# Patient Record
Sex: FEMALE | Race: WHITE | NOT HISPANIC OR LATINO | Employment: UNEMPLOYED | URBAN - METROPOLITAN AREA
[De-identification: names, ages, dates, MRNs, and addresses within clinical notes are randomized per-mention and may not be internally consistent; named-entity substitution may affect disease eponyms.]

---

## 2018-09-28 ENCOUNTER — OFFICE VISIT (OUTPATIENT)
Dept: URGENT CARE | Facility: CLINIC | Age: 33
End: 2018-09-28
Payer: MEDICAID

## 2018-09-28 VITALS
TEMPERATURE: 99 F | DIASTOLIC BLOOD PRESSURE: 67 MMHG | HEART RATE: 77 BPM | SYSTOLIC BLOOD PRESSURE: 109 MMHG | WEIGHT: 138 LBS | HEIGHT: 64 IN | OXYGEN SATURATION: 98 % | RESPIRATION RATE: 18 BRPM | BODY MASS INDEX: 23.56 KG/M2

## 2018-09-28 DIAGNOSIS — L73.9 FOLLICULITIS: Primary | ICD-10-CM

## 2018-09-28 PROCEDURE — 99214 OFFICE O/P EST MOD 30 MIN: CPT | Mod: S$GLB,,, | Performed by: FAMILY MEDICINE

## 2018-09-28 RX ORDER — CLONAZEPAM 0.5 MG/1
TABLET ORAL
Refills: 0 | COMMUNITY
Start: 2018-07-11 | End: 2018-11-04

## 2018-09-28 RX ORDER — CLONAZEPAM 1 MG/1
1 TABLET ORAL DAILY
Refills: 0 | COMMUNITY
Start: 2018-08-20 | End: 2019-08-07

## 2018-09-28 RX ORDER — LORATADINE 10 MG/1
10 TABLET ORAL DAILY
Qty: 30 TABLET | Refills: 2 | Status: SHIPPED | OUTPATIENT
Start: 2018-09-28 | End: 2019-05-30

## 2018-09-28 RX ORDER — DESVENLAFAXINE SUCCINATE 50 MG/1
50 TABLET, EXTENDED RELEASE ORAL DAILY
Refills: 1 | COMMUNITY
Start: 2018-08-21

## 2018-09-28 RX ORDER — DOXYCYCLINE 100 MG/1
100 CAPSULE ORAL 2 TIMES DAILY
Qty: 14 CAPSULE | Refills: 0 | Status: SHIPPED | OUTPATIENT
Start: 2018-09-28 | End: 2018-10-05

## 2018-09-28 NOTE — PROGRESS NOTES
"Subjective:       Patient ID: Jocelynn Fowler is a 33 y.o. female.    Vitals:  height is 5' 4" (1.626 m) and weight is 62.6 kg (138 lb). Her temperature is 98.5 °F (36.9 °C). Her blood pressure is 109/67 and her pulse is 77. Her respiration is 18 and oxygen saturation is 98%.     Chief Complaint: Rash        Rash   This is a new problem. The current episode started yesterday. The problem is unchanged. The affected locations include the face. The rash is characterized by blistering, itchiness and redness. She was exposed to a new animal. Pertinent negatives include no fever, joint pain, shortness of breath or sore throat. Past treatments include nothing. The treatment provided no relief.     Review of Systems   Constitution: Negative for chills and fever.   HENT: Negative for sore throat.    Respiratory: Negative for shortness of breath.    Skin: Positive for itching and rash.   Musculoskeletal: Negative for joint pain.   All other systems reviewed and are negative.      Objective:      Physical Exam   Constitutional: She is oriented to person, place, and time. She appears well-developed and well-nourished. No distress.   HENT:   Head: Normocephalic and atraumatic.   Right Ear: External ear normal.   Left Ear: External ear normal.   Nose: Nose normal.   Eyes: Conjunctivae and EOM are normal.   Neck: Normal range of motion.   Pulmonary/Chest: Effort normal. No respiratory distress.   Musculoskeletal: Normal range of motion.   Neurological: She is alert and oriented to person, place, and time.   Skin: Skin is warm and dry. Lesion and rash noted. Rash is macular, papular and pustular. She is not diaphoretic.   Psychiatric: She has a normal mood and affect. Her behavior is normal. Judgment and thought content normal.       Assessment:       1. Folliculitis        Plan:         Folliculitis  -     doxycycline (VIBRAMYCIN) 100 MG Cap; Take 1 capsule (100 mg total) by mouth 2 (two) times daily. for 7 days  Dispense: 14 capsule; " Refill: 0  -     loratadine (CLARITIN) 10 mg tablet; Take 1 tablet (10 mg total) by mouth once daily.  Dispense: 30 tablet; Refill: 2      Patient Instructions   If you were prescribed a narcotic or controlled medication, do not drive or operate heavy equipment or machinery while taking these medications.  You must understand that you've received an Urgent Care treatment only and that you may be released before all your medical problems are known or treated. You, the patient, will arrange for follow up care as instructed.  Follow up with your PCP or specialty clinic as directed in the next 1-2 weeks if not improved or as needed.  You can call [phone] to schedule an appointment with the appropriate provider.  If your condition worsens we recommend that you receive another evaluation at the emergency room immediately or contact your primary medical clinics after hours call service to discuss your concerns.  Please return here or go to the Emergency Department for any concerns or worsening of condition.  Wash wound with soap and water. If you notice redness, swelling or pain, start soaking in warm water with epsom salts, and start antibiotic and make appointment to be seen by your doctor.     Folliculitis  Folliculitis is an inflammation of a hair follicle. A hair follicle is the little pocket where a hair grows out of the skin. Bacteria normally live on the skin. But sometimes bacteria can get trapped in a follicle and cause infection. This causes a bumpy rash. The area over the follicles is red and raised. It may itch or be painful. The bumps may have fluid (pus) inside. The pus may leak and then form crusts. Sores can spread to other areas of the body. Once it goes away, folliculitis can come back at any time. Severe cases may cause permanent hair loss and scarring.  Folliculitis can happen anywhere on the body where hair grows. It can be caused by rubbing from tight clothing. Ingrown hairs can cause it.  Soaking in a hot tub or swimming pool that has bacteria in the water can cause it. It may also occur if a hair follicle is blocked by a bandage.  Sores often go away in a few days with no treatment. In some cases, medicine may be given. A small piece of skin or pus may be taken to find the type of bacteria causing the infection.  Home care  The healthcare provider may prescribe an antibiotic cream or ointment.  Oral antibiotics may also be prescribed. Or you may be told to use an over-the-counter antibiotic cream. Follow all instructions when using any of these medicines.  General care:  · Apply warm, moist compresses to the sores for 20 minutes up to 3 times a day. You can make a compress by soaking a cloth in warm water. Squeeze out excess water.  · Dont cut, poke, or squeeze the sores. This can be painful and spread infection.  · Dont scratch the affected area. Scratching can delay healing.  · Dont shave the areas affected by folliculitis.  · If the sores leak fluid, cover the area with a nonstick gauze bandage. Use as little tape as possible. Carefully discard all soiled bandages.  · Dress in loose cotton clothing.  · Change sheets and blankets if they are soiled by pus. Wash all clothes, towels, sheets, and cloth diapers in soap and hot water. Do not share clothes, towels, or sheets with other family members.  · Do not soak the sores in bath water. This can spread infection. Instead, keep the area clean by gently washing sores with soap and warm water.  · Wash your hands or use antibacterial gels often to prevent spreading the bacteria.  Follow-up care  Follow up with your healthcare provider, or as advised.  When to seek medical advice  Call your healthcare provider right away if any of these occur:  · Fever of 100.4°F (38°C) or higher  · Spreading of the rash  · Rash does not get better with treatment  · Redness or swelling that gets worse  · Rash becomes more painful  · Foul-smelling fluid leaking from  the skin  · Rash improves, but then comes back   Date Last Reviewed: 11/1/2016  © 4719-2077 [employer]. [address] [place] [address], [place] [address]. All rights reserved. This information is not intended as a substitute for professional medical care. Always follow your healthcare professional's instructions.

## 2018-09-28 NOTE — PATIENT INSTRUCTIONS
If you were prescribed a narcotic or controlled medication, do not drive or operate heavy equipment or machinery while taking these medications.  You must understand that you've received an Urgent Care treatment only and that you may be released before all your medical problems are known or treated. You, the patient, will arrange for follow up care as instructed.  Follow up with your PCP or specialty clinic as directed in the next 1-2 weeks if not improved or as needed.  You can call [phone] to schedule an appointment with the appropriate provider.  If your condition worsens we recommend that you receive another evaluation at the emergency room immediately or contact your primary medical clinics after hours call service to discuss your concerns.  Please return here or go to the Emergency Department for any concerns or worsening of condition.  Wash wound with soap and water. If you notice redness, swelling or pain, start soaking in warm water with epsom salts, and start antibiotic and make appointment to be seen by your doctor.     Folliculitis  Folliculitis is an inflammation of a hair follicle. A hair follicle is the little pocket where a hair grows out of the skin. Bacteria normally live on the skin. But sometimes bacteria can get trapped in a follicle and cause infection. This causes a bumpy rash. The area over the follicles is red and raised. It may itch or be painful. The bumps may have fluid (pus) inside. The pus may leak and then form crusts. Sores can spread to other areas of the body. Once it goes away, folliculitis can come back at any time. Severe cases may cause permanent hair loss and scarring.  Folliculitis can happen anywhere on the body where hair grows. It can be caused by rubbing from tight clothing. Ingrown hairs can cause it. Soaking in a hot tub or swimming pool that has bacteria in the water can cause it. It may also occur if a hair follicle is blocked by a bandage.  Sores often go away  in a few days with no treatment. In some cases, medicine may be given. A small piece of skin or pus may be taken to find the type of bacteria causing the infection.  Home care  The healthcare provider may prescribe an antibiotic cream or ointment.  Oral antibiotics may also be prescribed. Or you may be told to use an over-the-counter antibiotic cream. Follow all instructions when using any of these medicines.  General care:  · Apply warm, moist compresses to the sores for 20 minutes up to 3 times a day. You can make a compress by soaking a cloth in warm water. Squeeze out excess water.  · Dont cut, poke, or squeeze the sores. This can be painful and spread infection.  · Dont scratch the affected area. Scratching can delay healing.  · Dont shave the areas affected by folliculitis.  · If the sores leak fluid, cover the area with a nonstick gauze bandage. Use as little tape as possible. Carefully discard all soiled bandages.  · Dress in loose cotton clothing.  · Change sheets and blankets if they are soiled by pus. Wash all clothes, towels, sheets, and cloth diapers in soap and hot water. Do not share clothes, towels, or sheets with other family members.  · Do not soak the sores in bath water. This can spread infection. Instead, keep the area clean by gently washing sores with soap and warm water.  · Wash your hands or use antibacterial gels often to prevent spreading the bacteria.  Follow-up care  Follow up with your healthcare provider, or as advised.  When to seek medical advice  Call your healthcare provider right away if any of these occur:  · Fever of 100.4°F (38°C) or higher  · Spreading of the rash  · Rash does not get better with treatment  · Redness or swelling that gets worse  · Rash becomes more painful  · Foul-smelling fluid leaking from the skin  · Rash improves, but then comes back   Date Last Reviewed: 11/1/2016  © 9629-4881 [employer]. [address] [place] [address], [place] [address]. All  rights reserved. This information is not intended as a substitute for professional medical care. Always follow your healthcare professional's instructions.

## 2018-11-04 ENCOUNTER — HOSPITAL ENCOUNTER (EMERGENCY)
Facility: HOSPITAL | Age: 33
Discharge: HOME | End: 2018-11-05
Attending: EMERGENCY MEDICINE
Payer: MEDICAID

## 2018-11-04 DIAGNOSIS — J02.0 STREP PHARYNGITIS: Primary | ICD-10-CM

## 2018-11-04 LAB
B-HCG UR QL: NEGATIVE
BACTERIA #/AREA URNS HPF: NORMAL /HPF
BILIRUB UR QL STRIP: NEGATIVE
CLARITY UR: CLEAR
COLOR UR: YELLOW
CTP QC/QA: YES
DEPRECATED S PYO AG THROAT QL EIA: POSITIVE
FLUAV AG SPEC QL IA: NEGATIVE
FLUBV AG SPEC QL IA: NEGATIVE
GLUCOSE UR QL STRIP: NEGATIVE
HGB UR QL STRIP: NEGATIVE
KETONES UR QL STRIP: NEGATIVE
LEUKOCYTE ESTERASE UR QL STRIP: ABNORMAL
MICROSCOPIC COMMENT: NORMAL
NITRITE UR QL STRIP: NEGATIVE
PH UR STRIP: 8 [PH] (ref 5–8)
PROT UR QL STRIP: NEGATIVE
RBC #/AREA URNS HPF: 2 /HPF (ref 0–4)
SP GR UR STRIP: 1.02 (ref 1–1.03)
SPECIMEN SOURCE: NORMAL
SQUAMOUS #/AREA URNS HPF: 7 /HPF
URN SPEC COLLECT METH UR: ABNORMAL
UROBILINOGEN UR STRIP-ACNC: ABNORMAL EU/DL
WBC #/AREA URNS HPF: 3 /HPF (ref 0–5)

## 2018-11-04 PROCEDURE — 81000 URINALYSIS NONAUTO W/SCOPE: CPT

## 2018-11-04 PROCEDURE — 96372 THER/PROPH/DIAG INJ SC/IM: CPT

## 2018-11-04 PROCEDURE — 87400 INFLUENZA A/B EACH AG IA: CPT

## 2018-11-04 PROCEDURE — 96374 THER/PROPH/DIAG INJ IV PUSH: CPT

## 2018-11-04 PROCEDURE — 96361 HYDRATE IV INFUSION ADD-ON: CPT

## 2018-11-04 PROCEDURE — 25000003 PHARM REV CODE 250: Performed by: NURSE PRACTITIONER

## 2018-11-04 PROCEDURE — 99284 EMERGENCY DEPT VISIT MOD MDM: CPT | Mod: 25

## 2018-11-04 PROCEDURE — 81025 URINE PREGNANCY TEST: CPT | Performed by: NURSE PRACTITIONER

## 2018-11-04 PROCEDURE — 87880 STREP A ASSAY W/OPTIC: CPT

## 2018-11-04 PROCEDURE — 63600175 PHARM REV CODE 636 W HCPCS: Performed by: NURSE PRACTITIONER

## 2018-11-04 RX ORDER — FLUOXETINE HYDROCHLORIDE 20 MG/1
20 CAPSULE ORAL DAILY
COMMUNITY

## 2018-11-04 RX ORDER — IBUPROFEN 600 MG/1
600 TABLET ORAL EVERY 6 HOURS PRN
Qty: 20 TABLET | Refills: 0 | Status: SHIPPED | OUTPATIENT
Start: 2018-11-04 | End: 2018-11-04 | Stop reason: SDUPTHER

## 2018-11-04 RX ORDER — IBUPROFEN 600 MG/1
600 TABLET ORAL EVERY 6 HOURS PRN
Qty: 20 TABLET | Refills: 0 | Status: SHIPPED | OUTPATIENT
Start: 2018-11-04 | End: 2019-05-30

## 2018-11-04 RX ORDER — DEXAMETHASONE SODIUM PHOSPHATE 4 MG/ML
8 INJECTION, SOLUTION INTRA-ARTICULAR; INTRALESIONAL; INTRAMUSCULAR; INTRAVENOUS; SOFT TISSUE
Status: COMPLETED | OUTPATIENT
Start: 2018-11-04 | End: 2018-11-04

## 2018-11-04 RX ORDER — ACETAMINOPHEN 500 MG
1000 TABLET ORAL
Status: COMPLETED | OUTPATIENT
Start: 2018-11-04 | End: 2018-11-04

## 2018-11-04 RX ORDER — IBUPROFEN 600 MG/1
600 TABLET ORAL
Status: COMPLETED | OUTPATIENT
Start: 2018-11-04 | End: 2018-11-04

## 2018-11-04 RX ADMIN — PENICILLIN G BENZATHINE 1.2 MILLION UNITS: 1200000 INJECTION, SUSPENSION INTRAMUSCULAR at 11:11

## 2018-11-04 RX ADMIN — ACETAMINOPHEN 1000 MG: 500 TABLET, FILM COATED ORAL at 11:11

## 2018-11-04 RX ADMIN — SODIUM CHLORIDE 1000 ML: 0.9 INJECTION, SOLUTION INTRAVENOUS at 11:11

## 2018-11-04 RX ADMIN — IBUPROFEN 600 MG: 600 TABLET, FILM COATED ORAL at 11:11

## 2018-11-04 RX ADMIN — DEXAMETHASONE SODIUM PHOSPHATE 8 MG: 4 INJECTION, SOLUTION INTRAMUSCULAR; INTRAVENOUS at 11:11

## 2018-11-05 VITALS
TEMPERATURE: 99 F | BODY MASS INDEX: 23.56 KG/M2 | DIASTOLIC BLOOD PRESSURE: 71 MMHG | HEIGHT: 64 IN | WEIGHT: 138 LBS | SYSTOLIC BLOOD PRESSURE: 119 MMHG | OXYGEN SATURATION: 98 % | RESPIRATION RATE: 18 BRPM | HEART RATE: 88 BPM

## 2018-11-05 NOTE — ED PROVIDER NOTES
"Encounter Date: 11/4/2018    SCRIBE #1 NOTE: ISon, am scribing for, and in the presence of,  Winnie Cristina NP . I have scribed the following portions of the note - Other sections scribed: HPI, ROS .       History     Chief Complaint   Patient presents with    Generalized Body Aches     since yesterday; reports n/v, one episode v; reports "hurting all over," fevers, chills, overheating, and sore throat; reports taking ibuprofen this morning for fever control     CC: Generalized Body Aches    33 y.o. Female with a past medical history of Anemia, Anxiety, Hep C w/o coma, chronic, Hypothyroid, IV drug user, Ovarian cyst, and Renal disorder presents to ED c/o generalized body aches for the past 2x days. She also reports subjective fever, chills, sore throat, and 3x episodes of emesis yesterday. She notes attempted tx with Ibuprofen this morning with no relief. Patient notes smoking half a pack of cigarettes a day, last using heroin 1x month ago, and now using Methadone. She denies neck pain/stiffness, back pain, abdominal pain, diarrhea, dysuria, congestion, rhinorrhea, cough, and sick contacts. No other associated symptoms.             The history is provided by the patient. No  was used.     Review of patient's allergies indicates:   Allergen Reactions    Toradol [ketorolac] Hives     Past Medical History:   Diagnosis Date    Anemia     Anxiety     Hep C w/o coma, chronic     Hypothyroid     IV drug user     Ovarian cyst     Renal disorder      History reviewed. No pertinent surgical history.  Family History   Problem Relation Age of Onset    Heart disease Maternal Grandmother     Heart disease Maternal Grandfather     Heart disease Paternal Grandmother     Heart disease Paternal Grandfather      Social History     Tobacco Use    Smoking status: Current Every Day Smoker     Packs/day: 0.50     Types: Cigarettes    Smokeless tobacco: Never Used   Substance Use Topics "    Alcohol use: No    Drug use: No     Comment: heroin past     Review of Systems   Constitutional: Positive for chills and fever. Negative for appetite change.   HENT: Positive for sore throat. Negative for congestion and rhinorrhea.    Eyes: Negative for visual disturbance.   Respiratory: Negative for cough and shortness of breath.    Cardiovascular: Negative for chest pain.   Gastrointestinal: Positive for vomiting (3x). Negative for abdominal pain and diarrhea.   Genitourinary: Negative for dysuria, frequency and urgency.   Musculoskeletal: Negative for gait problem.   Skin: Negative for rash.   Neurological: Negative for syncope.       Physical Exam     Initial Vitals [11/04/18 2210]   BP Pulse Resp Temp SpO2   111/66 (!) 116 18 (!) 100.5 °F (38.1 °C) 97 %      MAP       --         Physical Exam    Constitutional: She appears well-developed and well-nourished. She is not diaphoretic. She appears ill. No distress.   HENT:   Head: Normocephalic and atraumatic.   Right Ear: Hearing, tympanic membrane, external ear and ear canal normal.   Left Ear: Hearing, tympanic membrane, external ear and ear canal normal.   Nose: Nose normal.   Mouth/Throat: Uvula is midline and mucous membranes are normal. No uvula swelling. Oropharyngeal exudate, posterior oropharyngeal edema and posterior oropharyngeal erythema present. No tonsillar abscesses.   Eyes: Conjunctivae and EOM are normal. Pupils are equal, round, and reactive to light.   Neck: Trachea normal, normal range of motion, full passive range of motion without pain and phonation normal. Neck supple. No stridor present. No spinous process tenderness and no muscular tenderness present. Normal range of motion present. No neck rigidity. No Brudzinski's sign and no Kernig's sign noted.   Cardiovascular: Normal rate, regular rhythm and normal heart sounds.   Pulmonary/Chest: Breath sounds normal. No respiratory distress.   Abdominal: Soft. There is no tenderness.    Musculoskeletal: Normal range of motion.   Lymphadenopathy:     She has cervical adenopathy.   Neurological: She is alert and oriented to person, place, and time.   Skin: Skin is warm and dry.   Psychiatric: She has a normal mood and affect. Her behavior is normal.         ED Course   Procedures  Labs Reviewed   THROAT SCREEN, RAPID - Abnormal; Notable for the following components:       Result Value    Rapid Strep A Screen Positive (*)     All other components within normal limits   URINALYSIS, REFLEX TO URINE CULTURE - Abnormal; Notable for the following components:    Urobilinogen, UA 4.0-6.0 (*)     Leukocytes, UA Trace (*)     All other components within normal limits    Narrative:     Preferred Collection Type->Urine, Clean Catch   INFLUENZA A AND B ANTIGEN   URINALYSIS MICROSCOPIC    Narrative:     Preferred Collection Type->Urine, Clean Catch   POCT URINE PREGNANCY          Imaging Results    None          Medical Decision Making:   ED Management:  This is an evaluation of a 33 y.o. female that presents to the Emergency Department for sore throat, body aches, subjective fever and chills.  The patient is a non-toxic, afebrile, and well appearing female. On physical exam, there is tonsillar exudates, erythema; she has no trismus, uvula deviation, drooling, stridor, or respiratory distress. No sign of PTA. There is no cervical lymphadenopathy. Neck is soft and supple with no meningeal signs. Breath sounds clear and equal bilaterally. Abdomen soft and nontender. No skin lesions or rashes. No murmur.  Heart with regular rate and rhythm. Doubt endocarditis.    Vital Signs Are Reassuring.   Rapid Strep Test: Positive  Urine without infection.    She was given IV fluids, Bicillin, Decadron, ibuprofen, and Tylenol with good relief in symptoms.  Upon reassessment, she reports feeling improved.  Heart rate has improved still with low-grade temp.    Given the above findings, my overall impression is strep pharyngitis.  Given the above findings, I do not think the patient has OM, OE, peritonsillar abscess, retropharyngeal abscess, epiglotitis, meningitis, endocarditis, or airway compromise.    The patient will be discharged home. Home care: OTC medications for symptomatic relief, sore throat self care DC instructions. The diagnosis, treatment plan, instructions for follow-up and reevaluation with Primary Care as well as ED return precautions have been discussed with the patient and understanding of the information was verbalized. All questions or concerns from the patient have been addressed.     This case was discussed with and the patient has been examined by Dr. Velazquez who is in agreement with my assessment and plan.            Scribe Attestation:   Scribe #1: I performed the above scribed service and the documentation accurately describes the services I performed. I attest to the accuracy of the note.    Attending Attestation:           Physician Attestation for Scribe:  Physician Attestation Statement for Scribe #1: I,  Winnie Cristina NP, reviewed documentation, as scribed by Son Dan in my presence, and it is both accurate and complete.                    Clinical Impression:   The encounter diagnosis was Strep pharyngitis.      Disposition:   Disposition: Discharged  Condition: Stable                        Winnie Cristina NP  11/05/18 0007

## 2018-11-05 NOTE — ED TRIAGE NOTES
Pt c/o fever, generalized body aches, sore throat, nausea since this AM. Also reports vomiting yesterday but none today. Denies cough, congestion, dysuria, diarrhea. Denies flu shot. Pain is 10/10. Pt took ibuprofen at 1030 this AM

## 2018-11-05 NOTE — DISCHARGE INSTRUCTIONS
Alternate ibuprofen and Tylenol to help decrease fever and pain. Drink plenty of fluids to stay hydrated.    Please return to the Emergency Department for any new or worsening symptoms including:  Worsening pain, difficulty swallowing, fever, chest pain, shortness of breath, loss of consciousness, dizziness, weakness, or any other concerns.     Please follow up with your Primary Care Provider within in the week. If you do not have one, you may contact the one listed on your discharge paperwork or you may also call the [facility] Appointment Desk at [phone] to schedule an appointment with one.     Please take all medication as prescribed.

## 2019-02-12 ENCOUNTER — LAB VISIT (OUTPATIENT)
Dept: LAB | Facility: HOSPITAL | Age: 34
End: 2019-02-12
Attending: INTERNAL MEDICINE
Payer: MEDICAID

## 2019-02-12 DIAGNOSIS — E03.8 OTHER SPECIFIED HYPOTHYROIDISM: ICD-10-CM

## 2019-02-12 LAB
T3 SERPL-MCNC: 112 NG/DL
T4 FREE SERPL-MCNC: 0.99 NG/DL
THYROPEROXIDASE IGG SERPL-ACNC: 14.7 IU/ML
TSH SERPL DL<=0.005 MIU/L-ACNC: 1.37 UIU/ML

## 2019-02-12 PROCEDURE — 84439 ASSAY OF FREE THYROXINE: CPT

## 2019-02-12 PROCEDURE — 84443 ASSAY THYROID STIM HORMONE: CPT

## 2019-02-12 PROCEDURE — 84480 ASSAY TRIIODOTHYRONINE (T3): CPT

## 2019-02-12 PROCEDURE — 36415 COLL VENOUS BLD VENIPUNCTURE: CPT

## 2019-02-12 PROCEDURE — 86376 MICROSOMAL ANTIBODY EACH: CPT

## 2019-05-29 ENCOUNTER — TELEPHONE (OUTPATIENT)
Dept: OBSTETRICS AND GYNECOLOGY | Facility: CLINIC | Age: 34
End: 2019-05-29

## 2019-05-29 NOTE — TELEPHONE ENCOUNTER
----- Message from Huma Conde sent at 5/29/2019 12:56 PM CDT -----  Contact: Self      Can the clinic reply in [facility]: N    Who Called: NIR NORMAN [[id number]]    Date of Positive Preg Test: 04/29/2019    1st day of Last Menstrual Cycle: 01/03 OR 01/4/2019    List Any Difficulties: Spotting early on and was dehydrated. Pt is currently at a Methadone clinic. 90MGs a day.     What Number to Call Back: [phone]

## 2019-05-30 ENCOUNTER — HOSPITAL ENCOUNTER (EMERGENCY)
Facility: HOSPITAL | Age: 34
Discharge: HOME | End: 2019-05-30
Attending: EMERGENCY MEDICINE
Payer: MEDICAID

## 2019-05-30 VITALS
DIASTOLIC BLOOD PRESSURE: 55 MMHG | SYSTOLIC BLOOD PRESSURE: 98 MMHG | BODY MASS INDEX: 29.01 KG/M2 | HEART RATE: 74 BPM | OXYGEN SATURATION: 97 % | TEMPERATURE: 98 F | RESPIRATION RATE: 18 BRPM | WEIGHT: 169 LBS

## 2019-05-30 DIAGNOSIS — L29.9 ITCHING: Primary | ICD-10-CM

## 2019-05-30 DIAGNOSIS — Z34.90 PREGNANCY, UNSPECIFIED GESTATIONAL AGE: ICD-10-CM

## 2019-05-30 LAB
B-HCG UR QL: POSITIVE
BILIRUBIN, POC UA: ABNORMAL
BLOOD, POC UA: NEGATIVE
CLARITY, POC UA: CLEAR
COLOR, POC UA: ABNORMAL
CTP QC/QA: YES
GLUCOSE, POC UA: ABNORMAL
KETONES, POC UA: ABNORMAL
LEUKOCYTE EST, POC UA: NEGATIVE
NITRITE, POC UA: NEGATIVE
PH UR STRIP: 6 [PH]
PROTEIN, POC UA: ABNORMAL
SPECIFIC GRAVITY, POC UA: >=1.03
UROBILINOGEN, POC UA: >=8 E.U./DL

## 2019-05-30 PROCEDURE — 81025 URINE PREGNANCY TEST: CPT | Mod: ER | Performed by: EMERGENCY MEDICINE

## 2019-05-30 PROCEDURE — 99283 EMERGENCY DEPT VISIT LOW MDM: CPT | Mod: 25,ER

## 2019-05-30 PROCEDURE — 81003 URINALYSIS AUTO W/O SCOPE: CPT | Mod: ER

## 2019-05-30 RX ORDER — DIPHENHYDRAMINE HCL 25 MG
25 CAPSULE ORAL EVERY 6 HOURS PRN
Qty: 20 CAPSULE | Refills: 0 | Status: SHIPPED | OUTPATIENT
Start: 2019-05-30 | End: 2019-08-21

## 2019-05-30 RX ORDER — LORATADINE 10 MG/1
10 TABLET ORAL DAILY
Qty: 60 TABLET | Refills: 0 | Status: SHIPPED | OUTPATIENT
Start: 2019-05-30 | End: 2020-05-29

## 2019-05-30 NOTE — ED PROVIDER NOTES
"Encounter Date: 5/30/2019    SCRIBE #1 NOTE: I, Damion Smalls, am scribing for, and in the presence of,  Dr. Lai. I have scribed the following portions of the note - Other sections scribed: HPI, ROS, PE.       History     Chief Complaint   Patient presents with    Itching     Pt states," My  cleans houses out and has been itching for several days. I have been itching for last two days and I am pregnant."     Jocelynn Fowler is a 33 y.o. female who presents to the ED complaining of itching all over her body for 2 days.  Pt states that her  had scabies recently.  Pt received an US and was 14 weeks and 3 days pregnant on 5/1/19.  Pt has not yet seen a OB/GYN and G7:P5:A1.  Pt drove herself to the ED today.  Pt has a history of Hep C and hyperthyroidism and no mediation was prescribed for unknown reason.  Pt smokes and is currently on methadone.    The history is provided by the patient. No  was used.     Review of patient's allergies indicates:   Allergen Reactions    Toradol [ketorolac] Hives     Past Medical History:   Diagnosis Date    Anemia     Anxiety     Depression     Encounter for blood transfusion     Hep C w/o coma, chronic     Hypothyroid     IV drug user     Ovarian cyst     Renal disorder     Substance abuse      History reviewed. No pertinent surgical history.  Family History   Problem Relation Age of Onset    Heart disease Maternal Grandmother     Heart disease Maternal Grandfather     Heart disease Paternal Grandmother     Heart disease Paternal Grandfather      Social History     Tobacco Use    Smoking status: Current Every Day Smoker     Packs/day: 0.50     Types: Cigarettes    Smokeless tobacco: Never Used   Substance Use Topics    Alcohol use: No    Drug use: No     Comment: heroin past     Review of Systems   Constitutional: Negative for fever.   HENT: Negative for sore throat.    Eyes: Negative for redness.   Respiratory: Negative for cough.  "   Gastrointestinal: Negative for diarrhea, nausea and vomiting.   Skin: Negative for color change and rash.        Positive generalized itching.   Neurological: Negative for dizziness and weakness.   All other systems reviewed and are negative.      Physical Exam     Initial Vitals [05/30/19 1631]   BP Pulse Resp Temp SpO2   100/62 90 16 98 °F (36.7 °C) 98 %      MAP       --         Patient gave consent to have physical exam performed.    Physical Exam    Nursing note and vitals reviewed.  Constitutional: She appears well-developed and well-nourished.   HENT:   Head: Normocephalic and atraumatic.   Right Ear: External ear normal.   Left Ear: External ear normal.   Nose: Nose normal.   Mouth/Throat: Oropharynx is clear and moist.   Eyes: Conjunctivae and EOM are normal. Pupils are equal, round, and reactive to light.   Neck: Normal range of motion and phonation normal. Neck supple.   Cardiovascular: Normal rate, regular rhythm, normal heart sounds and intact distal pulses. Exam reveals no gallop and no friction rub.    No murmur heard.  Pulmonary/Chest: Effort normal and breath sounds normal. No stridor. No respiratory distress. She has no wheezes. She has no rhonchi. She has no rales. She exhibits no tenderness.   Abdominal: Soft. Bowel sounds are normal. She exhibits no distension. There is no tenderness. There is no rigidity, no rebound and no guarding.   Musculoskeletal: Normal range of motion. She exhibits no edema or tenderness.   Neurological: She is alert and oriented to person, place, and time. She has normal strength. No cranial nerve deficit or sensory deficit. GCS score is 15. GCS eye subscore is 4. GCS verbal subscore is 5. GCS motor subscore is 6.   Skin: Skin is warm and dry. Capillary refill takes less than 2 seconds. No rash noted.   Psychiatric: She has a normal mood and affect. Her behavior is normal.         ED Course   Procedures  Labs Reviewed   POCT URINE PREGNANCY - Abnormal; Notable for the  following components:       Result Value    POC Preg Test, Ur Positive (*)     All other components within normal limits   POCT URINALYSIS W/O SCOPE - Abnormal; Notable for the following components:    Glucose, UA Trace (*)     Bilirubin, UA 1+ (*)     Ketones, UA 1+ (*)     Spec Grav UA >=1.030 (*)     Blood, UA Negative (*)     Protein, UA Trace (*)     Urobilinogen, UA >=8.0 (*)     Nitrite, UA Negative (*)     Leukocytes, UA Negative (*)     All other components within normal limits   POCT URINALYSIS W/O SCOPE          Imaging Results    None          Medical Decision Making:   Clinical Tests:   Lab Tests: Ordered and Reviewed  ED Management:  Will order UA and UPT.    Will discharge patient home with Benadryl, Claritin, and Prenatal vitamins.  Chief complaint: generalized itching.  itching all over her body for 2 days.   Differential diagnosis:  Rash, allergic reaction, puritis and urinary tract infection  Treatment in the ED Physical Exam.  Patient drove herself.  Will give prescription for Benadryl to help with itching.  Discussed outpatient treatment plan and prescriptions.  Please establish above OB and follow up within 1 day    Fill and take prescriptions as directed.  Return to the ED if symptoms worsen or do not resolve.   Answered questions and discussed discharge plan.    Follow up with PCP/specialist in 1 day.            Scribe Attestation:   Scribe #1: I performed the above scribed service and the documentation accurately describes the services I performed. I attest to the accuracy of the note.     I, Dr. Laine Lai, personally performed the services described in this documentation. This document was produced by a scribe under my direction and in my presence. All medical record entries made by the scribe were at my direction and in my presence.  I have reviewed the chart and agree that the record reflects my personal performance and is accurate and complete. Laine Lai, DO.      06/01/2019 9:24 AM             Clinical Impression:     1. Itching    2. Pregnancy, unspecified gestational age                                   Laine Lai DO  06/01/19 0954

## 2019-05-30 NOTE — DISCHARGE INSTRUCTIONS
You are taking multiple medications which are not safe in pregnancy.  I highly recommend you follow-up with your psychiatrist 1st thing in the morning for management of your prescriptions drugs to adapt them to pregnancy.

## 2019-05-31 ENCOUNTER — TELEPHONE (OUTPATIENT)
Dept: OBSTETRICS AND GYNECOLOGY | Facility: CLINIC | Age: 34
End: 2019-05-31

## 2019-06-03 ENCOUNTER — INITIAL PRENATAL (OUTPATIENT)
Dept: OBSTETRICS AND GYNECOLOGY | Facility: CLINIC | Age: 34
End: 2019-06-03
Payer: MEDICAID

## 2019-06-03 VITALS
WEIGHT: 163.81 LBS | BODY MASS INDEX: 28.12 KG/M2 | DIASTOLIC BLOOD PRESSURE: 70 MMHG | SYSTOLIC BLOOD PRESSURE: 100 MMHG

## 2019-06-03 DIAGNOSIS — B18.2 CHRONIC HEPATITIS C WITHOUT HEPATIC COMA: Primary | ICD-10-CM

## 2019-06-03 DIAGNOSIS — F19.10 IV DRUG ABUSE COMPLICATING PREGNANCY: ICD-10-CM

## 2019-06-03 DIAGNOSIS — O99.320 IV DRUG ABUSE COMPLICATING PREGNANCY: ICD-10-CM

## 2019-06-03 DIAGNOSIS — Z87.59 HISTORY OF POSTPARTUM HEMORRHAGE: ICD-10-CM

## 2019-06-03 DIAGNOSIS — Z64.1 GRAND MULTIPARITY: ICD-10-CM

## 2019-06-03 DIAGNOSIS — O09.92 SUPERVISION OF HIGH RISK PREGNANCY IN SECOND TRIMESTER: ICD-10-CM

## 2019-06-03 DIAGNOSIS — F11.90 METHADONE USE: ICD-10-CM

## 2019-06-03 DIAGNOSIS — F43.10 PTSD (POST-TRAUMATIC STRESS DISORDER): ICD-10-CM

## 2019-06-03 DIAGNOSIS — E05.00 GRAVES DISEASE: ICD-10-CM

## 2019-06-03 PROCEDURE — 87086 URINE CULTURE/COLONY COUNT: CPT

## 2019-06-03 PROCEDURE — 99999 PR PBB SHADOW E&M-EST. PATIENT-LVL II: ICD-10-PCS | Mod: PBBFAC,,, | Performed by: STUDENT IN AN ORGANIZED HEALTH CARE EDUCATION/TRAINING PROGRAM

## 2019-06-03 PROCEDURE — 99203 OFFICE O/P NEW LOW 30 MIN: CPT | Mod: TH,S$PBB,, | Performed by: STUDENT IN AN ORGANIZED HEALTH CARE EDUCATION/TRAINING PROGRAM

## 2019-06-03 PROCEDURE — 99203 PR OFFICE/OUTPT VISIT, NEW, LEVL III, 30-44 MIN: ICD-10-PCS | Mod: TH,S$PBB,, | Performed by: STUDENT IN AN ORGANIZED HEALTH CARE EDUCATION/TRAINING PROGRAM

## 2019-06-03 PROCEDURE — 99212 OFFICE O/P EST SF 10 MIN: CPT | Mod: PBBFAC,TH,PO | Performed by: STUDENT IN AN ORGANIZED HEALTH CARE EDUCATION/TRAINING PROGRAM

## 2019-06-03 PROCEDURE — 99999 PR PBB SHADOW E&M-EST. PATIENT-LVL II: CPT | Mod: PBBFAC,,, | Performed by: STUDENT IN AN ORGANIZED HEALTH CARE EDUCATION/TRAINING PROGRAM

## 2019-06-03 PROCEDURE — 88175 CYTOPATH C/V AUTO FLUID REDO: CPT

## 2019-06-03 PROCEDURE — 87491 CHLMYD TRACH DNA AMP PROBE: CPT

## 2019-06-03 RX ORDER — PROPRANOLOL HYDROCHLORIDE 10 MG/1
TABLET ORAL
Refills: 0 | COMMUNITY
Start: 2019-05-06
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
## 2019-06-04 ENCOUNTER — TELEPHONE (OUTPATIENT)
Dept: MATERNAL FETAL MEDICINE | Facility: CLINIC | Age: 34
End: 2019-06-04

## 2019-06-04 ENCOUNTER — TELEPHONE (OUTPATIENT)
Dept: OBSTETRICS AND GYNECOLOGY | Facility: CLINIC | Age: 34
End: 2019-06-04

## 2019-06-04 NOTE — TELEPHONE ENCOUNTER
----- Message from Darvin Hdez MA sent at 6/4/2019  4:00 PM CDT -----      ----- Message -----  From: Tere Claros  Sent: 6/4/2019   3:28 PM  To: , #    Ob pt states that she needs cream for scabies. Pharmacy at [facility] in [place] at [phone]. Pt can be reached at [phone].

## 2019-06-04 NOTE — TELEPHONE ENCOUNTER
Scheduled patient for Anatomy ultrasound on Monday 6/10 at [facility] and consultation on Tuesday 6/11 at [facility]. Pt verbalizes understanding of time and date and location of both appts and appt slip mailed.

## 2019-06-05 ENCOUNTER — APPOINTMENT (OUTPATIENT)
Dept: LAB | Facility: HOSPITAL | Age: 34
End: 2019-06-05
Attending: OBSTETRICS & GYNECOLOGY
Payer: MEDICAID

## 2019-06-05 LAB
ABO + RH BLD: NORMAL
ALBUMIN SERPL BCP-MCNC: 2.7 G/DL (ref 3.5–5.2)
ALP SERPL-CCNC: 125 U/L (ref 55–135)
ALT SERPL W/O P-5'-P-CCNC: 138 U/L (ref 10–44)
ANION GAP SERPL CALC-SCNC: 7 MMOL/L (ref 8–16)
AST SERPL-CCNC: 175 U/L (ref 10–40)
BASOPHILS # BLD AUTO: 0.02 K/UL (ref 0–0.2)
BASOPHILS NFR BLD: 0.3 % (ref 0–1.9)
BILIRUB SERPL-MCNC: 0.6 MG/DL (ref 0.1–1)
BLD GP AB SCN CELLS X3 SERPL QL: NORMAL
BUN SERPL-MCNC: 9 MG/DL (ref 6–20)
C TRACH DNA SPEC QL NAA+PROBE: NOT DETECTED
CALCIUM SERPL-MCNC: 9.3 MG/DL (ref 8.7–10.5)
CHLORIDE SERPL-SCNC: 106 MMOL/L (ref 95–110)
CO2 SERPL-SCNC: 24 MMOL/L (ref 23–29)
CREAT SERPL-MCNC: 0.7 MG/DL (ref 0.5–1.4)
DIFFERENTIAL METHOD: ABNORMAL
EOSINOPHIL # BLD AUTO: 0.1 K/UL (ref 0–0.5)
EOSINOPHIL NFR BLD: 1 % (ref 0–8)
ERYTHROCYTE [DISTWIDTH] IN BLOOD BY AUTOMATED COUNT: 15.9 % (ref 11.5–14.5)
EST. GFR  (AFRICAN AMERICAN): >60 ML/MIN/1.73 M^2
EST. GFR  (NON AFRICAN AMERICAN): >60 ML/MIN/1.73 M^2
GLUCOSE SERPL-MCNC: 68 MG/DL (ref 70–110)
HCT VFR BLD AUTO: 29.6 % (ref 37–48.5)
HGB BLD-MCNC: 9.3 G/DL (ref 12–16)
IMM GRANULOCYTES # BLD AUTO: 0.01 K/UL (ref 0–0.04)
IMM GRANULOCYTES NFR BLD AUTO: 0.2 % (ref 0–0.5)
LYMPHOCYTES # BLD AUTO: 1.6 K/UL (ref 1–4.8)
LYMPHOCYTES NFR BLD: 27 % (ref 18–48)
MCH RBC QN AUTO: 28.7 PG (ref 27–31)
MCHC RBC AUTO-ENTMCNC: 31.4 G/DL (ref 32–36)
MCV RBC AUTO: 91 FL (ref 82–98)
MONOCYTES # BLD AUTO: 0.4 K/UL (ref 0.3–1)
MONOCYTES NFR BLD: 7 % (ref 4–15)
N GONORRHOEA DNA SPEC QL NAA+PROBE: NOT DETECTED
NEUTROPHILS # BLD AUTO: 3.8 K/UL (ref 1.8–7.7)
NEUTROPHILS NFR BLD: 64.5 % (ref 38–73)
NRBC BLD-RTO: 0 /100 WBC
PLATELET # BLD AUTO: 245 K/UL (ref 150–350)
PMV BLD AUTO: 10.7 FL (ref 9.2–12.9)
POTASSIUM SERPL-SCNC: 4.1 MMOL/L (ref 3.5–5.1)
PROT SERPL-MCNC: 6.8 G/DL (ref 6–8.4)
RBC # BLD AUTO: 3.24 M/UL (ref 4–5.4)
SODIUM SERPL-SCNC: 137 MMOL/L (ref 136–145)
TSH SERPL DL<=0.005 MIU/L-ACNC: 1.85 UIU/ML (ref 0.4–4)
WBC # BLD AUTO: 5.88 K/UL (ref 3.9–12.7)

## 2019-06-05 PROCEDURE — 85025 COMPLETE CBC W/AUTO DIFF WBC: CPT

## 2019-06-05 PROCEDURE — 87522 HEPATITIS C REVRS TRNSCRPJ: CPT

## 2019-06-05 PROCEDURE — 86592 SYPHILIS TEST NON-TREP QUAL: CPT

## 2019-06-05 PROCEDURE — 36415 COLL VENOUS BLD VENIPUNCTURE: CPT | Mod: PO

## 2019-06-05 PROCEDURE — 86762 RUBELLA ANTIBODY: CPT

## 2019-06-05 PROCEDURE — 87340 HEPATITIS B SURFACE AG IA: CPT

## 2019-06-05 PROCEDURE — 80053 COMPREHEN METABOLIC PANEL: CPT

## 2019-06-05 PROCEDURE — 86703 HIV-1/HIV-2 1 RESULT ANTBDY: CPT

## 2019-06-05 PROCEDURE — 86850 RBC ANTIBODY SCREEN: CPT

## 2019-06-05 PROCEDURE — 84443 ASSAY THYROID STIM HORMONE: CPT

## 2019-06-05 NOTE — PROGRESS NOTES
No questions, Multiple OB risks, problems include Grand multiparity,Hx of PPH,Heroin addiction, Hepatitis C, Hx of thyroid disease,PTSD.need MFM consult.  I have reviewed the resident's note, evaluated the patient and agree with the diagnosis and management plan

## 2019-06-06 ENCOUNTER — TELEPHONE (OUTPATIENT)
Dept: OBSTETRICS AND GYNECOLOGY | Facility: CLINIC | Age: 34
End: 2019-06-06

## 2019-06-06 LAB
BACTERIA UR CULT: NORMAL
BACTERIA UR CULT: NORMAL
HBV SURFACE AG SERPL QL IA: NEGATIVE
HIV 1+2 AB+HIV1 P24 AG SERPL QL IA: NEGATIVE
RPR SER QL: NORMAL
RUBV IGG SER-ACNC: 15.5 IU/ML
RUBV IGG SER-IMP: REACTIVE

## 2019-06-06 NOTE — TELEPHONE ENCOUNTER
----- Message from Darvin Hdez MA sent at 6/6/2019 12:38 PM CDT -----  Contact: Self      ----- Message -----  From: Huma Conde  Sent: 6/6/2019  12:13 PM  To: [facility] Staff              Name of Who is Calling: NIR NORMAN [[id number]]      What is the request in detail: Pt states she spoke with the pharmacy and was advised the cream is not there for her to . Pt is requesting a call back. Please contact to further discuss and advise.        Can the clinic reply by [facility]: N      What Number to Call Back if not in [facility]: [phone]

## 2019-06-10 LAB
HCV RNA SERPL NAA+PROBE-LOG IU: 5.6 LOG (10) IU/ML
HCV RNA SERPL QL NAA+PROBE: DETECTED IU/ML
HCV RNA SPEC NAA+PROBE-ACNC: ABNORMAL IU/ML

## 2019-06-11 ENCOUNTER — TELEPHONE (OUTPATIENT)
Dept: MATERNAL FETAL MEDICINE | Facility: CLINIC | Age: 34
End: 2019-06-11

## 2019-06-11 NOTE — TELEPHONE ENCOUNTER
Call placed to patient to remind her of her appt with MFM this afternoon. Pt states she missed her ultrasound yesterday because she thought it was on Wednesday and is not able to come to consultation today because she does not have a car.     Pt r/s for next avail at [facility] on 6/25/19 at 3:40 pm for anatomy us and consultation. Let patient know it is our last scan of the day on a booked day, so please arrive at 3:15. Pt verbalizes understanding of information.

## 2019-06-12 ENCOUNTER — HOSPITAL ENCOUNTER (EMERGENCY)
Facility: HOSPITAL | Age: 34
Discharge: HOME | End: 2019-06-12
Attending: EMERGENCY MEDICINE
Payer: MEDICAID

## 2019-06-12 VITALS
SYSTOLIC BLOOD PRESSURE: 108 MMHG | DIASTOLIC BLOOD PRESSURE: 64 MMHG | TEMPERATURE: 99 F | OXYGEN SATURATION: 98 % | BODY MASS INDEX: 27.83 KG/M2 | RESPIRATION RATE: 18 BRPM | WEIGHT: 163 LBS | HEIGHT: 64 IN | HEART RATE: 92 BPM

## 2019-06-12 DIAGNOSIS — B34.9 VIRAL ILLNESS: Primary | ICD-10-CM

## 2019-06-12 LAB
BILIRUBIN, POC UA: ABNORMAL
BLOOD, POC UA: NEGATIVE
CLARITY, POC UA: CLEAR
COLOR, POC UA: YELLOW
CTP QC/QA: YES
GLUCOSE, POC UA: ABNORMAL
KETONES, POC UA: ABNORMAL
LEUKOCYTE EST, POC UA: ABNORMAL
NITRITE, POC UA: NEGATIVE
PH UR STRIP: 6 [PH]
PROTEIN, POC UA: ABNORMAL
S PYO RRNA THROAT QL PROBE: NEGATIVE
SPECIFIC GRAVITY, POC UA: 1.02
UROBILINOGEN, POC UA: >=8 E.U./DL

## 2019-06-12 PROCEDURE — 87081 CULTURE SCREEN ONLY: CPT

## 2019-06-12 PROCEDURE — 81003 URINALYSIS AUTO W/O SCOPE: CPT | Mod: ER

## 2019-06-12 PROCEDURE — 87880 STREP A ASSAY W/OPTIC: CPT | Mod: ER

## 2019-06-12 PROCEDURE — 99283 EMERGENCY DEPT VISIT LOW MDM: CPT | Mod: 25,ER

## 2019-06-12 RX ORDER — ONDANSETRON 4 MG/1
4 TABLET, ORALLY DISINTEGRATING ORAL EVERY 6 HOURS PRN
Qty: 10 TABLET | Refills: 0 | Status: SHIPPED | OUTPATIENT
Start: 2019-06-12

## 2019-06-13 NOTE — ED PROVIDER NOTES
Encounter Date: 6/12/2019    SCRIBE #1 NOTE: I, Le Castillo, am scribing for, and in the presence of,  Dr. Fountain. I have scribed the following portions of the note - Other sections scribed: HPI, ROS, PE.       History     Chief Complaint   Patient presents with    Fever     x 2 days. Motrin at 1200, last dose     Generalized Body Aches     x 2 days. +pregnant 5 months     Jocelynn Fowler is a 33 y.o. female who presents to the ED complaining of body aches and fever which started two day ago. Pt reports sore throat, mild congestion, and runny nose. Pt states her son had strep throat a week ago, but denies being in contact with him. Pt denies n/v, diarrhea, and dysuria.    The history is provided by the patient. No  was used.     Review of patient's allergies indicates:   Allergen Reactions    Toradol [ketorolac] Hives     Past Medical History:   Diagnosis Date    Anemia     Anxiety     Depression     Encounter for blood transfusion     Hep C w/o coma, chronic     Hypothyroid     IV drug user     Ovarian cyst     Renal disorder     Substance abuse      History reviewed. No pertinent surgical history.  Family History   Problem Relation Age of Onset    Heart disease Maternal Grandmother     Breast cancer Maternal Grandmother     Heart disease Maternal Grandfather     Heart disease Paternal Grandmother     Heart disease Paternal Grandfather     Ovarian cancer Neg Hx      Social History     Tobacco Use    Smoking status: Current Every Day Smoker     Packs/day: 0.50     Types: Cigarettes    Smokeless tobacco: Never Used   Substance Use Topics    Alcohol use: No    Drug use: No     Comment: heroin past     Review of Systems   Constitutional: Positive for fever (subjective).   HENT: Positive for congestion and sore throat.    Eyes: Negative.    Respiratory: Negative.  Negative for shortness of breath.    Cardiovascular: Negative.  Negative for chest pain.   Gastrointestinal:  Negative.  Negative for diarrhea, nausea and vomiting.   Endocrine: Negative.    Genitourinary: Negative.  Negative for dysuria.   Musculoskeletal: Negative.  Negative for myalgias.   Skin: Negative.  Negative for rash.   Allergic/Immunologic: Negative.    Neurological: Negative.  Negative for headaches.   Hematological: Negative.  Negative for adenopathy.   Psychiatric/Behavioral: Negative.  Negative for behavioral problems.   All other systems reviewed and are negative.      Physical Exam     Initial Vitals [06/12/19 2109]   BP Pulse Resp Temp SpO2   101/61 100 18 98.7 °F (37.1 °C) 96 %      MAP       --         Physical Exam    Nursing note and vitals reviewed.  Constitutional: She appears well-developed and well-nourished.   HENT:   Head: Normocephalic and atraumatic.   Right Ear: External ear normal.   Left Ear: External ear normal.   Nose: Nose normal.   Mouth/Throat: Posterior oropharyngeal edema present. No oropharyngeal exudate.   Swelling in throat, no exudate, and mucosal edema.     Eyes: Conjunctivae are normal.   Neck: Normal range of motion. Neck supple.   Cardiovascular: Normal rate and intact distal pulses.   Pulmonary/Chest: Effort normal. No respiratory distress.   Abdominal: Soft. There is no tenderness.   Musculoskeletal: Normal range of motion.   Lymphadenopathy:     She has no cervical adenopathy.   Neurological: She is alert and oriented to person, place, and time.   Skin: Skin is warm and dry. Capillary refill takes less than 2 seconds.   Psychiatric: She has a normal mood and affect. Her behavior is normal.         ED Course   Procedures  Labs Reviewed   POCT URINALYSIS W/O SCOPE - Abnormal; Notable for the following components:       Result Value    Glucose, UA Trace (*)     Bilirubin, UA 3+ (*)     Ketones, UA Trace (*)     Blood, UA Negative (*)     Protein, UA 1+ (*)     Urobilinogen, UA >=8.0 (*)     Nitrite, UA Negative (*)     Leukocytes, UA Trace (*)     All other components within  normal limits   CULTURE, STREP A,  THROAT   POCT URINALYSIS W/O SCOPE   POCT RAPID STREP A          Imaging Results    None          Medical Decision Making:   ED Management:  This patient has a nonfocal examination and presents with General symptomatology consistent with a viral infection.            Scribe Attestation:   Scribe #1: I performed the above scribed service and the documentation accurately describes the services I performed. I attest to the accuracy of the note.    This document was produced by a scribe under my direction and in my presence. I agree with the content of the note and have made any necessary edits.     Enrrique Fountain MD    06/12/2019 11:25 PM           Clinical Impression:     1. Viral illness                                   Enrrique Fountain MD  06/12/19 2414

## 2019-06-15 LAB — BACTERIA THROAT CULT: NORMAL

## 2019-06-18 ENCOUNTER — TELEPHONE (OUTPATIENT)
Dept: OBSTETRICS AND GYNECOLOGY | Facility: CLINIC | Age: 34
End: 2019-06-18

## 2019-06-18 NOTE — TELEPHONE ENCOUNTER
----- Message from Joceline Ko sent at 6/18/2019 11:13 AM CDT -----  Contact: Jessy Davison - Behavioral health   Name of Who is Calling: Jessy Davison       What is the request in detail: Jessy would like to confirm that pt has been seen by a OB provider, she would also like to know which provider pt will be seeing so she can coordinate care. Jessy states that she faxed over the release of information on 6/13/19      Can the clinic reply by [facility]: N       What Number to Call Back if not in [facility]: [phone]

## 2019-06-25 ENCOUNTER — INITIAL CONSULT (OUTPATIENT)
Dept: MATERNAL FETAL MEDICINE | Facility: CLINIC | Age: 34
End: 2019-06-25
Payer: MEDICAID

## 2019-06-25 ENCOUNTER — PROCEDURE VISIT (OUTPATIENT)
Dept: MATERNAL FETAL MEDICINE | Facility: CLINIC | Age: 34
End: 2019-06-25
Payer: MEDICAID

## 2019-06-25 VITALS
BODY MASS INDEX: 28.31 KG/M2 | SYSTOLIC BLOOD PRESSURE: 108 MMHG | WEIGHT: 164.88 LBS | DIASTOLIC BLOOD PRESSURE: 66 MMHG

## 2019-06-25 DIAGNOSIS — Z36.89 ENCOUNTER FOR ULTRASOUND TO CHECK FETAL GROWTH: Primary | ICD-10-CM

## 2019-06-25 DIAGNOSIS — B18.2 CHRONIC HEPATITIS C WITHOUT HEPATIC COMA: ICD-10-CM

## 2019-06-25 DIAGNOSIS — O09.92 SUPERVISION OF HIGH RISK PREGNANCY IN SECOND TRIMESTER: ICD-10-CM

## 2019-06-25 PROCEDURE — 99999 PR PBB SHADOW E&M-EST. PATIENT-LVL III: CPT | Mod: PBBFAC,,, | Performed by: PEDIATRICS

## 2019-06-25 PROCEDURE — 99213 OFFICE O/P EST LOW 20 MIN: CPT | Mod: PBBFAC,TH,PO,25 | Performed by: PEDIATRICS

## 2019-06-25 PROCEDURE — 99999 PR PBB SHADOW E&M-EST. PATIENT-LVL III: ICD-10-PCS | Mod: PBBFAC,,, | Performed by: PEDIATRICS

## 2019-06-25 PROCEDURE — 99204 OFFICE O/P NEW MOD 45 MIN: CPT | Mod: S$PBB,TH,25, | Performed by: PEDIATRICS

## 2019-06-25 PROCEDURE — 76811 PR US, OB FETAL EVAL & EXAM, TRANSABDOM,FIRST GESTATION: ICD-10-PCS | Mod: 26,S$PBB,, | Performed by: PEDIATRICS

## 2019-06-25 PROCEDURE — 76811 OB US DETAILED SNGL FETUS: CPT | Mod: 26,S$PBB,, | Performed by: PEDIATRICS

## 2019-06-25 PROCEDURE — 76811 OB US DETAILED SNGL FETUS: CPT | Mod: PBBFAC,PO | Performed by: PEDIATRICS

## 2019-06-25 PROCEDURE — 99204 PR OFFICE/OUTPT VISIT, NEW, LEVL IV, 45-59 MIN: ICD-10-PCS | Mod: S$PBB,TH,25, | Performed by: PEDIATRICS

## 2019-06-25 NOTE — LETTER
July 1, 2019      Jessie Winter MD  [address] [address]           [facility]  [address] [place] [address]  Phone: [phone]  Fax: [phone]          Patient: Jocelynn Fowler   MR Number: [id number]   YOB: 1985   Date of Visit: 6/25/2019       Dear Dr. Jessie Winter:    Thank you for referring Jocelynn Fowler to me for evaluation. Attached you will find relevant portions of my assessment and plan of care.    If you have questions, please do not hesitate to call me. I look forward to following Jocelynn Fowler along with you.    Sincerely,    Jonelle Verde MD    Enclosure  CC:  No Recipients    If you would like to receive this communication electronically, please contact [email or web address] or [phone] to request more information on [facility] access.    For providers and/or their staff who would like to refer a patient to [facility], please contact us through our one-stop-shop provider referral line, [facility], at [phone].    If you feel you have received this communication in error or would no longer like to receive these types of communications, please e-mail [email or web address]

## 2019-07-01 NOTE — PROGRESS NOTES
Indication  ========    Consultation: Fetal anatomy survey/ hx IV drug abuse/ current methadone use    History  ======    Previous Outcomes   4  Preg. no. 1  Outcome: live birth  Gest. age 40 w + 0 d  Gender: male  Details: , 8lbs 12oz ,   Preg. no. 2  Outcome: live birth  Gest. age 40 w + 0 d  Gender: female  Details: , 9lbs 1oz ,  , shoulder distocia  Preg. no. 3  Outcome: miscarriage  Details:   Preg. no. 4  Outcome: live birth  Gest. age 40 w + 0 d  Gender: male  Details: , 7lbs 12oz ,  , post partal hemorrhage  Preg. no. 5  Outcome: live birth  Gest. age 40 w + 0 d  Gender: female  Details: , 9lbs 3oz,   Preg. no. 6  Outcome: live birth  Gest. age 40 w + 0 d  Gender: male  Details: , 8lbs 12oz,   Risk Factors  Details: kidney stones  Details: Hep C  Details: anxiety/depression  Details: anemia    Pregnancy History  ==============    Genetic screening declined    Maternal Assessment  =================    Weight 75 kg  Weight (lb) 165 lb  BP syst 108 mmHg  BP diast 66 mmHg    Method  ======    Transabdominal ultrasound examination. 2D Color Doppler, Voluson S8. View: Suboptimal view: limited by fetal position    Pregnancy  =========    Tejeda pregnancy. Number of fetuses: 1    Dating  ======    LMP on: 2019  Cycle: irregular cycle  GA by LMP 22 w + 4 d  MELO by LMP: 10/25/2019  Ultrasound examination on: 2019  GA by U/S based upon: AC, BPD, Femur, HC  GA by U/S 22 w + 1 d  MELO by U/S: 10/28/2019  Assigned: based on ultrasound (AC, BPD, Femur, HC), selected on 2019  Assigned GA 22 w + 1 d  Assigned MELO: 10/28/2019  Pregnancy length 280 d    General Evaluation  ==============    Cardiac activity present.  bpm.  Fetal movements visualized.  Presentation breech.  Placenta anterior.  Umbilical cord 3 vessel cord, normal, normal insertion.  Amniotic fluid normal amount.    Fetal Biometry  ============    Standard  BPD 51.7 mm  21w 5d                 Hadlock    OFD 73.8 mm  24w 3d                Carlton    .0 mm  22w 2d                Hadlock    Cerebellum tr 24.0 mm  23w 0d                Peterson    Nuchal fold 5.2 mm  .8 mm  23w 3d                Hadlock    Femur 35.2 mm  21w 1d                Hadlock    Humerus 34.5 mm  21w 5d                Carlton    HC / AC 1.08     g          45%        Hussain    EFW (lb) 1 lb  EFW (oz) 1 oz  EFW by: Hadlock (BPD-HC-AC-FL)  Extended   7.6 mm  CM 4.8 mm    Head / Face / Neck  Cephalic index 0.70    Extremities / Bony Struc  FL / BPD 0.68    FL / HC 0.18    FL / AC 0.19    Other Structures   bpm    Fetal Anatomy  ============    Cranium: normal  Lateral ventricles: normal  Choroid plexus: normal  Midline falx: normal  Cavum septi pellucidi: normal  Cerebellum: normal  Cisterna magna: normal  Head / Neck  Rt lateral ventricle: normal  Lt lateral ventricle: normal  Rt choroid plexus: normal  Lt choroid plexus: normal  Posterior fossa: normal  Vermis: normal  Neck: appears normal  Nuchal fold: normal  Lips: suboptimal  Profile: normal  Nose: suboptimal  Face  Maxilla: normal  Mandible: normal  4-chamber view: suboptimal  RVOT view: normal  LVOT view: suboptimal  Heart / Thorax  Situs: normal  Aortic arch view: suboptimal  Ductal arch view: normal  SVC: normal  IVC: normal  3-vessel view: normal  3-vessel-trachea view: normal  Cardiac position: normal  Cardiac axis: normal  Cardiac size: normal  Cardiac rhythm: normal  Rt lung: normal  Lt lung: normal  Diaphragm: normal  Heart / Thorax: Septal views: normal  Cord insertion: suboptimal  Stomach: normal  Kidneys: normal  Bladder: normal  Genitals: normal  Abdomen  Abdom. wall: suboptimal  Abdom. cavity: normal  Rt kidney: normal  Lt kidney: normal  Liver: normal  Bowel: normal  Cervical spine: normal  Thoracic spine: normal  Lumbar spine: normal  Sacral spine: suboptimal  Arms: normal  Legs: normal  Rt arm: normal  Lt arm: normal  Rt  hand: suboptimal  Lt hand: suboptimal  Rt leg: normal  Lt leg: normal  Rt foot: normal  Lt foot: normal  Position of hands: suboptimal  Position of feet: normal  Gender: male  Wants to know gender: yes    Maternal Structures  ===============    Uterus / Cervix  Uterus: Normal  Other: Uterus and adnexa normal  Ovaries / Tubes / Adnexa  Rt ovary: Visualized  Rt ovary D1 22 mm  Rt ovary D2 30 mm  Rt ovary D3 18 mm  Rt ovary Vol 5.8 cm³  Lt ovary: Visualized  Lt ovary D1 28 mm  Lt ovary D2 15 mm  Lt ovary D3 7 mm  Lt ovary Vol 1.6 cm³            Consultation  ==========    Chief Complaint: AODA, Hepatitis C, Smoker      History of Present Illness: Pt is a 33 y.o.  who presents for US and consult. LMP 19, irregular cycles. US in [facility] ED on   gave MELO of 14w5d (confirmed by US).        OB History    7 Current  6 Term       3.515 kg (7 lb 12 oz)  5 Term       4.167 kg (9 lb 3 oz)  4 Term       3.515 kg (7 lb 12 oz)  Complications: Postpartum hemorrhage  3 SAB  2 Term       4.111 kg (9 lb 1 oz)  Complications: Shoulder Dystocia, Postpartum hemorrhage  1 Term       3.969 kg (8 lb 12 oz)      Past Medical History:  1. Anemia  2. Anxiety  3. Depression  4. Blood transfusion  5. Hep C w/o coma, chronic  6. Hypothyroid  7. IV drug user  8. Ovarian cyst  9. Renal disorder  10.Substance abuse/smoking  11.Grand Multiparity    Review of Systems: Negative x 9 systems    Surgical History: Negative    Family History:  1. Heart disease Maternal Grandmother/Grandfather, Paternal Grandmother/Grandfather  2. Breast cancer Maternal Grandmother  3. No genetic history    Social History:  1. 1/2 ppd smoker  2. Denies EtOH use  3. Prior heroin use; now on Methadone    Medications:  1. Clonazepam (KLONOPIN) 1 MG tablet po daily.  2. Fluoxetine (PROZAC) 20 mg by mouth once daily.  3. Chloratadine (CLARITIN) 10 mg tablet po once daily.  4. Methadone Take 90 mg by mouth once daily.  5. Propranolol (INDERAL) 10 MG po prn  daily  6. Desvenlafaxine succinate (PRISTIQ) 50 mg by mouth once daily.  7. Diphenhydramine (BENADRYL) 25 mg po q. 6 hours prn Itching.  8. Phenol (THROAT SPRAY) 1.4 % SprA q. 2 (two) hours.  9. PNV one po daily.    ALLERGIES: TORADOL (hives)    BP: 100/70 FHR: 145 bpm      DISCUSSION:    I. AODA: Ms. Fowler has history of extensive history of IV heroin abuse. She is in clinic and receiving 90 mg methadone po daily. She relapsed  in March/April and used IV heroin. Denies other drug use. HIV negative, hep c positive. She is stable without s/sx withdrawal.    II.Thyroid Disease: Pt also reports being told that she has Graves disease and(?) Hashimoto's thyroiditis. States she was told that she did not  have to take any medications. TFTs normal at this time.    III. Hepatitis C history without treatment. HCV log 5.60 (normal - <1.08 Log (10) IU/mL) ; HCV RNA Quant , 003 (nl - <12 IU/mL). It  seems likely that this is acute infection from recent relapse. She was negative by history last year.    IV. PTSD: Receiving counseling and medicated with Klonopin and Prozac.        RECOMMENDATIONS:    1. GI/Hepatology consult.  2. Consider ID consult.  3. Repeat LFTs/Hep C RNA titers monthly  4. TFT currently WNL; no therapy needed.  5. Continue counseling/treatment at center; continue Methadone 90 mg daily  6. High risk for PPH - be prepared.  7. Records from thyroid diagnosis. Repeat TFTs q. 4 - 6 weeks.  8. Continue klonopin and prozac and counseling for PTSD.  9. Growth scan in 4 - 6 weeks with serial growth.          I spent 45 min in patient care management and consultation with greater than 50% face-to-face.          Impression  =========    A detailed fetal anatomic ultrasound examination was performed for the following high risk indication: AODA.    No fetal structural malformations are identified; however, fetal imaging is incomplete today. A follow-up study will be scheduled to complete the  fetal anatomic  survey.    Fetal size today is consistent with established gestational age.  Cervical length is normal.  Placental location is normal without evidence of previa.          Recommendation  ==============    1. See above.    Thank you again for allowing us to participate in the care of your patients. If you have any questions concerning today's consultation, feel free  to contact me or one of my partners. We can be reached at [phone] during normal business hours. If you have a question after normal  business hours, please contact Labor and Delivery at [phone].

## 2019-07-12 ENCOUNTER — TELEPHONE (OUTPATIENT)
Dept: MATERNAL FETAL MEDICINE | Facility: CLINIC | Age: 34
End: 2019-07-12

## 2019-07-12 NOTE — TELEPHONE ENCOUNTER
Message left for patient to call [facility] clinic at [phone] to schedule follow up ultrasound.

## 2019-07-23 ENCOUNTER — TELEPHONE (OUTPATIENT)
Dept: MATERNAL FETAL MEDICINE | Facility: CLINIC | Age: 34
End: 2019-07-23

## 2019-07-23 NOTE — TELEPHONE ENCOUNTER
Attempted to reach patient. No answer or voicemail. Per Dr. Cruz, not able to see patient germán (no MFM availability); reschedule to next available appt not overbooked.    ----- Message from Darvin Hdez MA sent at 7/23/2019  2:29 PM CDT -----      ----- Message -----  From: Berny Urban  Sent: 7/23/2019  11:53 AM  To: , #    Pt has a u/s appt today she needs to reschedule it for tomorrow after her doctors appt [phone]

## 2019-07-24 ENCOUNTER — ROUTINE PRENATAL (OUTPATIENT)
Dept: OBSTETRICS AND GYNECOLOGY | Facility: CLINIC | Age: 34
End: 2019-07-24
Payer: MEDICAID

## 2019-07-24 VITALS
WEIGHT: 160.94 LBS | BODY MASS INDEX: 27.62 KG/M2 | SYSTOLIC BLOOD PRESSURE: 104 MMHG | DIASTOLIC BLOOD PRESSURE: 60 MMHG

## 2019-07-24 DIAGNOSIS — O09.92 SUPERVISION OF HIGH RISK PREGNANCY IN SECOND TRIMESTER: ICD-10-CM

## 2019-07-24 DIAGNOSIS — F19.10 IV DRUG ABUSE COMPLICATING PREGNANCY: ICD-10-CM

## 2019-07-24 DIAGNOSIS — R30.0 DYSURIA: ICD-10-CM

## 2019-07-24 DIAGNOSIS — O99.282 HYPERTHYROIDISM AFFECTING PREGNANCY IN SECOND TRIMESTER: ICD-10-CM

## 2019-07-24 DIAGNOSIS — Z30.2 ENCOUNTER FOR STERILIZATION: ICD-10-CM

## 2019-07-24 DIAGNOSIS — O99.342 MENTAL DISORDER AFFECTING PREGNANCY IN SECOND TRIMESTER: ICD-10-CM

## 2019-07-24 DIAGNOSIS — E05.00 GRAVES DISEASE: ICD-10-CM

## 2019-07-24 DIAGNOSIS — O99.320 IV DRUG ABUSE COMPLICATING PREGNANCY: ICD-10-CM

## 2019-07-24 DIAGNOSIS — B18.2 CHRONIC HEPATITIS C WITHOUT HEPATIC COMA: ICD-10-CM

## 2019-07-24 DIAGNOSIS — O09.292 PREGNANCY WITH POOR REPRODUCTIVE HISTORY IN SECOND TRIMESTER: Primary | ICD-10-CM

## 2019-07-24 DIAGNOSIS — E05.90 HYPERTHYROIDISM AFFECTING PREGNANCY IN SECOND TRIMESTER: ICD-10-CM

## 2019-07-24 DIAGNOSIS — O98.412 VIRAL HEPATITIS AFFECTING PREGNANCY IN SECOND TRIMESTER: ICD-10-CM

## 2019-07-24 PROBLEM — O98.419: Status: ACTIVE | Noted: 2019-07-24

## 2019-07-24 PROBLEM — O99.330 TOBACCO SMOKING AFFECTING PREGNANCY, ANTEPARTUM: Status: ACTIVE | Noted: 2019-07-24

## 2019-07-24 PROBLEM — O09.299 PREGNANCY WITH POOR REPRODUCTIVE HISTORY, ANTEPARTUM: Status: ACTIVE | Noted: 2019-06-03

## 2019-07-24 PROBLEM — O99.280 HYPERTHYROIDISM AFFECTING PREGNANCY, ANTEPARTUM: Status: ACTIVE | Noted: 2019-07-24

## 2019-07-24 PROBLEM — O99.340 MENTAL DISORDER AFFECTING PREGNANCY, ANTEPARTUM: Status: ACTIVE | Noted: 2019-07-24

## 2019-07-24 LAB
BILIRUB UR QL STRIP: NEGATIVE
CLARITY UR REFRACT.AUTO: CLEAR
COLOR UR AUTO: ABNORMAL
GLUCOSE UR QL STRIP: NEGATIVE
HGB UR QL STRIP: NEGATIVE
KETONES UR QL STRIP: ABNORMAL
LEUKOCYTE ESTERASE UR QL STRIP: ABNORMAL
MICROSCOPIC COMMENT: NORMAL
NITRITE UR QL STRIP: NEGATIVE
PH UR STRIP: 6 [PH] (ref 5–8)
PROT UR QL STRIP: NEGATIVE
RBC #/AREA URNS AUTO: 1 /HPF (ref 0–4)
SP GR UR STRIP: 1.02 (ref 1–1.03)
SQUAMOUS #/AREA URNS AUTO: 1 /HPF
URN SPEC COLLECT METH UR: ABNORMAL
WBC #/AREA URNS AUTO: 1 /HPF (ref 0–5)

## 2019-07-24 PROCEDURE — 99212 OFFICE O/P EST SF 10 MIN: CPT | Mod: PBBFAC,TH,PO | Performed by: STUDENT IN AN ORGANIZED HEALTH CARE EDUCATION/TRAINING PROGRAM

## 2019-07-24 PROCEDURE — 99999 PR PBB SHADOW E&M-EST. PATIENT-LVL II: ICD-10-PCS | Mod: PBBFAC,,, | Performed by: STUDENT IN AN ORGANIZED HEALTH CARE EDUCATION/TRAINING PROGRAM

## 2019-07-24 PROCEDURE — 99999 PR PBB SHADOW E&M-EST. PATIENT-LVL II: CPT | Mod: PBBFAC,,, | Performed by: STUDENT IN AN ORGANIZED HEALTH CARE EDUCATION/TRAINING PROGRAM

## 2019-07-24 PROCEDURE — 81001 URINALYSIS AUTO W/SCOPE: CPT

## 2019-07-24 PROCEDURE — 99214 OFFICE O/P EST MOD 30 MIN: CPT | Mod: TH,S$PBB,, | Performed by: STUDENT IN AN ORGANIZED HEALTH CARE EDUCATION/TRAINING PROGRAM

## 2019-07-24 PROCEDURE — 99214 PR OFFICE/OUTPT VISIT, EST, LEVL IV, 30-39 MIN: ICD-10-PCS | Mod: TH,S$PBB,, | Performed by: STUDENT IN AN ORGANIZED HEALTH CARE EDUCATION/TRAINING PROGRAM

## 2019-07-24 RX ORDER — NITROFURANTOIN 25; 75 MG/1; MG/1
100 CAPSULE ORAL DAILY
Qty: 30 CAPSULE | Refills: 3 | Status: SHIPPED | OUTPATIENT
Start: 2019-07-24 | End: 2019-08-23

## 2019-07-24 RX ORDER — SULFAMETHOXAZOLE AND TRIMETHOPRIM 800; 160 MG/1; MG/1
1 TABLET ORAL 2 TIMES DAILY
Qty: 14 TABLET | Refills: 0 | Status: SHIPPED | OUTPATIENT
Start: 2019-07-24 | End: 2019-07-31

## 2019-07-24 NOTE — PROGRESS NOTES
Subjective:       Patient ID: Jocelynn Fowler is a 33 y.o. female.    Chief Complaint:  Routine Prenatal Visit (pt has complaints of possible UTI)      History of Present Illness  Jocelynn Fowler is a 33 y.o. [id number] at 26w2d presents for routine ob visit. Patient is overall feeling well. She does report some painful urination and dark color. Denies nausea/vomiting. Has been having some mild chills which she feels is secondary to not having klonipin. She has a history of pyelonephritis as well as nephrolithiasis in prior pregnancy. She reports she does not feel current symptoms are similar.  She is seeing her psychiatrist Friday for follow up and to request prescription refill. She was scheduled for repeat growth scan yesterday but had to reschedule.   This IUP is complicated by h/o IVDU (on methadone currently), hepatitis C, Grave's disease, PTSD.  Patient denies contractions, denies vaginal bleeding, denies LOF.   Fetal Movement: normal.          GYN & OB History  Patient's last menstrual period was 2019.   Date of Last Pap: 6/10/2019    OB History    Para Term  AB Living   7 5 5 0 1 5   SAB TAB Ectopic Multiple Live Births   1       5      # Outcome Date GA Lbr Jacob/2nd Weight Sex Delivery Anes PTL Lv   7 Current            6 Term    3.969 kg (8 lb 12 oz) M Vag-Spont   YADI   5 Term    4.167 kg (9 lb 3 oz) F Vag-Spont   YADI   4 Term    3.515 kg (7 lb 12 oz) M Vag-Spont   YADI      Complications: Postpartum hemorrhage   3 SAB            2 Term    4.167 kg (9 lb 3 oz) F Vag-Spont   YADI      Complications: Shoulder Dystocia, Postpartum hemorrhage   1 Term    3.969 kg (8 lb 12 oz) M Vag-Spont   YADI       Review of Systems  Review of Systems   Constitutional: Negative for activity change, appetite change, chills and fever.   Eyes: Negative for visual disturbance.   Respiratory: Negative for cough and shortness of breath.    Cardiovascular: Negative for chest pain.    Gastrointestinal: Negative for abdominal pain, constipation, nausea and vomiting.   Genitourinary: Negative for dysuria, frequency, hematuria, pelvic pain, urgency, vaginal bleeding, vaginal discharge and vaginal odor.   Musculoskeletal: Negative for myalgias.   Integumentary:  Negative for rash.   Neurological: Negative for headaches.   Psychiatric/Behavioral: Negative for depression and sleep disturbance. The patient is not nervous/anxious.            Objective:    Physical Exam:   Constitutional: She is oriented to person, place, and time. She appears well-developed and well-nourished. No distress.    HENT:   Head: Normocephalic and atraumatic.    Eyes: Conjunctivae and EOM are normal.    Neck: Normal range of motion. Neck supple. No thyromegaly present.    Cardiovascular: Normal rate and regular rhythm.     Pulmonary/Chest: Effort normal. No respiratory distress.        Abdominal: Soft. She exhibits no distension. There is no tenderness.             Musculoskeletal: Normal range of motion and moves all extremeties. She exhibits tenderness (bilateral CVA tenderness). She exhibits no edema.       Neurological: She is alert and oriented to person, place, and time.    Skin: Skin is warm and dry. No rash noted.    Psychiatric: She has a normal mood and affect. Her behavior is normal.          Assessment:        1. Supervision of high risk pregnancy in second trimester    2. IV drug abuse complicating pregnancy    3. Graves disease    4. Chronic hepatitis C without hepatic coma    5. Mental disorder affecting pregnancy, antepartum    6. Pregnancy with poor reproductive history, antepartum    7. Viral hepatitis affecting pregnancy, antepartum    8. Hyperthyroidism affecting pregnancy, antepartum    9. Encounter for sterilization    10. Tobacco smoking affecting pregnancy, antepartum               Plan:      Jocelynn was seen today for routine prenatal visit.    Diagnoses and all orders for this visit:    Supervision of  high risk pregnancy in second trimester  -     OB Glucose Screen  -     CBC auto differential  -     TSH  -     Urinalysis    IV drug abuse complicating pregnancy  - currently on methadone 100 daily   - most recent drug use in April     H/o Graves disease  - Hashimoto?   - followed by M   - most recent TSH normal   - repeat ordered     Chronic hepatitis C without hepatic coma  -     Comprehensive metabolic panel  -     HEPATITIS C RNA, QUANTITATIVE, PCR  - hepatology referral ordered; will likely occur postpartum   - most recent viral load > 400,000    Mental disorder affecting pregnancy, antepartum  - follows with psychiatrist Dr. Cardona   - next appointment Friday  - will request klonipin refill at this time      Encounter for sterilization  - pp BTL paperwork signed today     Tobacco smoking affecting pregnancy, antepartum      Dysuria   - patient reports pain with urination as well as hesitancy   - afebrile in clinic   - bilateral CVA tenderness noted on exam   - considered treating empirically with bactrim and macrobid prophylaxis but given significant flank pain will send to [facility] for immediate evaluation; team notified   -     sulfamethoxazole-trimethoprim 800-160mg (BACTRIM DS) 800-160 mg Tab; Take 1 tablet by mouth 2 (two) times daily. for 7 days  -     nitrofurantoin, macrocrystal-monohydrate, (MACROBID) 100 MG capsule; Take 1 capsule (100 mg total) by mouth once daily.        Orders Placed This Encounter   Procedures    OB Glucose Screen    CBC auto differential    TSH    Comprehensive metabolic panel    HEPATITIS C RNA, QUANTITATIVE, PCR    Urinalysis       Return to care in 2 weeks.     Mary Ann Boudreaux MD  OBGYN, PGY-2

## 2019-07-30 ENCOUNTER — TELEPHONE (OUTPATIENT)
Dept: OBSTETRICS AND GYNECOLOGY | Facility: CLINIC | Age: 34
End: 2019-07-30

## 2019-07-30 NOTE — TELEPHONE ENCOUNTER
----- Message from Tere Claros sent at 7/30/2019  8:50 AM CDT -----  Ob pt needs to talk to nurse about her uti results. Pt can be reached at [phone].

## 2019-08-03 NOTE — PROGRESS NOTES
Seen and examined.  Agree with note.  All questions answered.    1. Pregnancy with poor reproductive history in second trimester  OB Glucose Screen    CBC auto differential   2. Supervision of high risk pregnancy in second trimester     3. IV drug abuse complicating pregnancy     4. Graves disease  TSH   5. Hyperthyroidism affecting pregnancy in second trimester  TSH   6. Chronic hepatitis C without hepatic coma  Comprehensive metabolic panel    HEPATITIS C RNA, QUANTITATIVE, PCR   7. Viral hepatitis affecting pregnancy in second trimester  Comprehensive metabolic panel    HEPATITIS C RNA, QUANTITATIVE, PCR   8. Mental disorder affecting pregnancy in second trimester     9. Encounter for sterilization     10. Dysuria  Urinalysis    sulfamethoxazole-trimethoprim 800-160mg (BACTRIM DS) 800-160 mg Tab    nitrofurantoin, macrocrystal-monohydrate, (MACROBID) 100 MG capsule    Urinalysis Microscopic

## 2019-08-05 ENCOUNTER — HOSPITAL ENCOUNTER (EMERGENCY)
Facility: HOSPITAL | Age: 34
Discharge: HOME | End: 2019-08-05
Attending: EMERGENCY MEDICINE
Payer: MEDICAID

## 2019-08-05 VITALS
WEIGHT: 160 LBS | BODY MASS INDEX: 29.44 KG/M2 | RESPIRATION RATE: 18 BRPM | SYSTOLIC BLOOD PRESSURE: 113 MMHG | OXYGEN SATURATION: 100 % | TEMPERATURE: 98 F | DIASTOLIC BLOOD PRESSURE: 56 MMHG | HEIGHT: 62 IN | HEART RATE: 84 BPM

## 2019-08-05 DIAGNOSIS — F11.20 METHADONE DEPENDENCE: Primary | ICD-10-CM

## 2019-08-05 PROCEDURE — 99281 EMR DPT VST MAYX REQ PHY/QHP: CPT

## 2019-08-05 PROCEDURE — 99282 EMERGENCY DEPT VISIT SF MDM: CPT | Mod: ,,, | Performed by: PHYSICIAN ASSISTANT

## 2019-08-05 PROCEDURE — 99282 PR EMERGENCY DEPT VISIT,LEVEL II: ICD-10-PCS | Mod: ,,, | Performed by: PHYSICIAN ASSISTANT

## 2019-08-05 NOTE — ED PROVIDER NOTES
Encounter Date: 8/5/2019       History     Chief Complaint   Patient presents with    Medication Refill     Told patient we do not give out methadone, states she still wants to check in so she doesnt detox     Ms Fowler is a 33yoF 28w gravid who presents for methadone request; pertinent PMHx history heroin abuse with OD history, anxiety, depression, hep C.  Patient's car broke down this morning she was unable to get to the methadone clinic for scheduled dose this morning.  Her counselor advised that she may be able to get methadone from the ED.  States she feels like she is starting to get sick but denies nausea/vomiting, tachycardia, chest pain, shortness of breath, dizziness, weakness, abdominal pain. No abdominal cramping, vaginal bleeding or discharge. Patient has regular OB follow-up.  The patients available PMH, PSH, Social History, medications, allergies, and triage vital signs were reviewed just prior to their medical evaluation.    Please be advised this text was dictated with Tele[employer] software and may contain errors due to translation.           Review of patient's allergies indicates:   Allergen Reactions    Toradol [ketorolac] Hives     Past Medical History:   Diagnosis Date    Anemia     Anxiety     Depression     Encounter for blood transfusion     Hep C w/o coma, chronic     Hypothyroid     IV drug user     Ovarian cyst     Renal disorder     Substance abuse      History reviewed. No pertinent surgical history.  Family History   Problem Relation Age of Onset    Heart disease Maternal Grandmother     Breast cancer Maternal Grandmother     Heart disease Maternal Grandfather     Heart disease Paternal Grandmother     Heart disease Paternal Grandfather     Ovarian cancer Neg Hx      Social History     Tobacco Use    Smoking status: Current Every Day Smoker     Packs/day: 0.50     Types: Cigarettes    Smokeless tobacco: Never Used   Substance Use Topics    Alcohol use: No    Drug  use: No     Comment: heroin past     Review of Systems   Constitutional: Negative for chills and fever.   Respiratory: Negative for shortness of breath.    Cardiovascular: Negative for chest pain and palpitations.   Gastrointestinal: Negative for abdominal pain, nausea and vomiting.   Musculoskeletal: Negative for back pain.   Skin: Negative for rash.   Neurological: Negative for dizziness and weakness.       Physical Exam     Initial Vitals [08/05/19 0914]   BP Pulse Resp Temp SpO2   (!) 113/56 84 18 98.2 °F (36.8 °C) 100 %      MAP       --         Physical Exam    Vitals reviewed.  Constitutional: She appears well-developed and well-nourished. She is not diaphoretic. No distress.   Well-appearing female in NAD, VSS, afebrile, 99% on RA.     HENT:   Head: Normocephalic and atraumatic.   Right Ear: External ear normal.   Left Ear: External ear normal.   Nose: Nose normal.   Eyes: Conjunctivae and EOM are normal. Pupils are equal, round, and reactive to light.   Cardiovascular: Normal rate, regular rhythm and intact distal pulses.   Pulmonary/Chest: Breath sounds normal.   Abdominal:   Gravid abdomen   Neurological: She is alert and oriented to person, place, and time. She has normal strength. No cranial nerve deficit.   Skin: Skin is warm and dry. Capillary refill takes less than 2 seconds. No erythema. No pallor.   Psychiatric: She has a normal mood and affect. Her behavior is normal. Judgment and thought content normal.         ED Course   Procedures  Labs Reviewed - No data to display       Imaging Results    None          Medical Decision Making:   History:   Old Medical Records: I decided to obtain old medical records.  Old Records Summarized: records from clinic visits and records from previous admission(s).  Initial Assessment:   Patient presents for methadone request after being unable to make it to clinic this morning, 28 week gravid, VSS, afebrile, no abdominal tenderness or cramping  Differential  Diagnosis:   DDx methadone dependence. Physical exam and history taking lower clinical suspicion for malingering, heroin withdrawal syndrome, threatened or incomplete .  ED Management:  Patient advised that we do not give methadone in ED.  I offered her Zofran Rx, though she declines at this time stating that never works.  Also declined benzo in ED at this time.  She will try call an Uber to the methadone clinic and would like to leave at this time.  Stable for discharge with close OB follow-up. Patient agreed to plan of care and voiced understanding. Discharged in stable condition with strict ED return precautions.    Rachana Jimenez PA-C  2019    I discussed the following case, diagnosis and plan of care with attending physician.                Attending Attestation:     Physician Attestation Statement for NP/PA:   I discussed this assessment and plan of this patient with the NP/PA, but I did not personally examine the patient. The face to face encounter was performed by the NP/PA.                     Clinical Impression:       ICD-10-CM ICD-9-CM   1. Methadone dependence F11.20 304.00         Disposition:   Disposition: Discharged  Condition: Stable                        Rachana Jimenez PA-C  19 [id number]

## 2019-08-05 NOTE — ED TRIAGE NOTES
"Patient is a 33 year old female who is 28 weeks pregnant with her 6th child in need of a medication refill of 100 mg dose of Methadone. Dose yesterday was taken around 11:00am. Patient states her normal daily methadone is 100 mg. Patient states she has been receiving treatment for 2 years. Patient denies any chest pain, abdominal pain, shortness of breath, headache, vomiting or nausea other than the occasional morning sickness. Patient states, "my car wont start and it's parked outside so I can't get to the clinic."  "

## 2019-08-05 NOTE — DISCHARGE INSTRUCTIONS
Go to methadone clinic as advised. Return to the ER immediately if he develops uncontrollable vomiting, abdominal cramping or vaginal bleeding.  Our goal in the emergency department is to always give you outstanding care and exceptional service. You may receive a survey by mail or e-mail in the next week regarding your experience in our ED. We would greatly appreciate your completing and returning the survey. Your feedback provides us with a way to recognize our staff who give very good care and it helps us learn how to improve when your experience was below our aspiration of excellence.

## 2019-08-07 ENCOUNTER — ROUTINE PRENATAL (OUTPATIENT)
Dept: OBSTETRICS AND GYNECOLOGY | Facility: CLINIC | Age: 34
End: 2019-08-07
Payer: MEDICAID

## 2019-08-07 VITALS
SYSTOLIC BLOOD PRESSURE: 110 MMHG | WEIGHT: 164.25 LBS | BODY MASS INDEX: 30.04 KG/M2 | DIASTOLIC BLOOD PRESSURE: 70 MMHG

## 2019-08-07 DIAGNOSIS — O99.330 TOBACCO SMOKING AFFECTING PREGNANCY, ANTEPARTUM: ICD-10-CM

## 2019-08-07 DIAGNOSIS — O09.299 PREGNANCY WITH POOR REPRODUCTIVE HISTORY, ANTEPARTUM: Primary | ICD-10-CM

## 2019-08-07 DIAGNOSIS — O99.340 MENTAL DISORDER AFFECTING PREGNANCY, ANTEPARTUM: ICD-10-CM

## 2019-08-07 DIAGNOSIS — O98.419 VIRAL HEPATITIS AFFECTING PREGNANCY, ANTEPARTUM: ICD-10-CM

## 2019-08-07 DIAGNOSIS — O99.280 HYPERTHYROIDISM AFFECTING PREGNANCY, ANTEPARTUM: ICD-10-CM

## 2019-08-07 DIAGNOSIS — E05.90 HYPERTHYROIDISM AFFECTING PREGNANCY, ANTEPARTUM: ICD-10-CM

## 2019-08-07 LAB
ALBUMIN SERPL BCP-MCNC: 2.6 G/DL (ref 3.5–5.2)
ALP SERPL-CCNC: 100 U/L (ref 55–135)
ALT SERPL W/O P-5'-P-CCNC: 49 U/L (ref 10–44)
ANION GAP SERPL CALC-SCNC: 7 MMOL/L (ref 8–16)
AST SERPL-CCNC: 60 U/L (ref 10–40)
BASOPHILS # BLD AUTO: 0.01 K/UL (ref 0–0.2)
BASOPHILS NFR BLD: 0.2 % (ref 0–1.9)
BILIRUB SERPL-MCNC: 0.5 MG/DL (ref 0.1–1)
BUN SERPL-MCNC: 6 MG/DL (ref 6–20)
CALCIUM SERPL-MCNC: 9.1 MG/DL (ref 8.7–10.5)
CHLORIDE SERPL-SCNC: 105 MMOL/L (ref 95–110)
CO2 SERPL-SCNC: 23 MMOL/L (ref 23–29)
CREAT SERPL-MCNC: 0.7 MG/DL (ref 0.5–1.4)
DIFFERENTIAL METHOD: ABNORMAL
EOSINOPHIL # BLD AUTO: 0.1 K/UL (ref 0–0.5)
EOSINOPHIL NFR BLD: 0.8 % (ref 0–8)
ERYTHROCYTE [DISTWIDTH] IN BLOOD BY AUTOMATED COUNT: 13.8 % (ref 11.5–14.5)
EST. GFR  (AFRICAN AMERICAN): >60 ML/MIN/1.73 M^2
EST. GFR  (NON AFRICAN AMERICAN): >60 ML/MIN/1.73 M^2
GLUCOSE SERPL-MCNC: 57 MG/DL (ref 70–140)
GLUCOSE SERPL-MCNC: 68 MG/DL (ref 70–110)
HCT VFR BLD AUTO: 29.1 % (ref 37–48.5)
HGB BLD-MCNC: 9.2 G/DL (ref 12–16)
IMM GRANULOCYTES # BLD AUTO: 0.02 K/UL (ref 0–0.04)
IMM GRANULOCYTES NFR BLD AUTO: 0.3 % (ref 0–0.5)
LYMPHOCYTES # BLD AUTO: 1.5 K/UL (ref 1–4.8)
LYMPHOCYTES NFR BLD: 23.3 % (ref 18–48)
MCH RBC QN AUTO: 28 PG (ref 27–31)
MCHC RBC AUTO-ENTMCNC: 31.6 G/DL (ref 32–36)
MCV RBC AUTO: 89 FL (ref 82–98)
MONOCYTES # BLD AUTO: 0.4 K/UL (ref 0.3–1)
MONOCYTES NFR BLD: 5.9 % (ref 4–15)
NEUTROPHILS # BLD AUTO: 4.6 K/UL (ref 1.8–7.7)
NEUTROPHILS NFR BLD: 69.5 % (ref 38–73)
NRBC BLD-RTO: 0 /100 WBC
PLATELET # BLD AUTO: 220 K/UL (ref 150–350)
PMV BLD AUTO: 10.5 FL (ref 9.2–12.9)
POTASSIUM SERPL-SCNC: 3.7 MMOL/L (ref 3.5–5.1)
PROT SERPL-MCNC: 6.5 G/DL (ref 6–8.4)
RBC # BLD AUTO: 3.28 M/UL (ref 4–5.4)
SODIUM SERPL-SCNC: 135 MMOL/L (ref 136–145)
TSH SERPL DL<=0.005 MIU/L-ACNC: 1.23 UIU/ML (ref 0.4–4)
WBC # BLD AUTO: 6.56 K/UL (ref 3.9–12.7)

## 2019-08-07 PROCEDURE — 99999 PR PBB SHADOW E&M-EST. PATIENT-LVL III: CPT | Mod: PBBFAC,,, | Performed by: STUDENT IN AN ORGANIZED HEALTH CARE EDUCATION/TRAINING PROGRAM

## 2019-08-07 PROCEDURE — 99213 OFFICE O/P EST LOW 20 MIN: CPT | Mod: PBBFAC,TH,PO | Performed by: STUDENT IN AN ORGANIZED HEALTH CARE EDUCATION/TRAINING PROGRAM

## 2019-08-07 PROCEDURE — 84443 ASSAY THYROID STIM HORMONE: CPT

## 2019-08-07 PROCEDURE — 36415 COLL VENOUS BLD VENIPUNCTURE: CPT | Mod: PO

## 2019-08-07 PROCEDURE — 99213 OFFICE O/P EST LOW 20 MIN: CPT | Mod: TH,S$PBB,, | Performed by: STUDENT IN AN ORGANIZED HEALTH CARE EDUCATION/TRAINING PROGRAM

## 2019-08-07 PROCEDURE — 82950 GLUCOSE TEST: CPT

## 2019-08-07 PROCEDURE — 85025 COMPLETE CBC W/AUTO DIFF WBC: CPT

## 2019-08-07 PROCEDURE — 99213 PR OFFICE/OUTPT VISIT, EST, LEVL III, 20-29 MIN: ICD-10-PCS | Mod: TH,S$PBB,, | Performed by: STUDENT IN AN ORGANIZED HEALTH CARE EDUCATION/TRAINING PROGRAM

## 2019-08-07 PROCEDURE — 87522 HEPATITIS C REVRS TRNSCRPJ: CPT

## 2019-08-07 PROCEDURE — 99999 PR PBB SHADOW E&M-EST. PATIENT-LVL III: ICD-10-PCS | Mod: PBBFAC,,, | Performed by: STUDENT IN AN ORGANIZED HEALTH CARE EDUCATION/TRAINING PROGRAM

## 2019-08-07 PROCEDURE — 80053 COMPREHEN METABOLIC PANEL: CPT

## 2019-08-07 RX ORDER — HYDROXYZINE HYDROCHLORIDE 50 MG/1
25 TABLET, FILM COATED ORAL 2 TIMES DAILY PRN
Qty: 60 TABLET | Refills: 0 | Status: SHIPPED | OUTPATIENT
Start: 2019-08-07

## 2019-08-07 NOTE — PROGRESS NOTES
Complaints today: No contractions, vaginal bleeding, leakage of fluid. Reports good fetal movement. She has been able to get her methadone without issue. Her psychiatrist recently discontinued her klonopin and she feels anxious. She was started on buspar to replace the klonopin, but reports that it does not seem to be helping.    /70   Wt 74.5 kg (164 lb 3.9 oz)   LMP 2019   BMI 30.04 kg/m²     33 y.o., at 28w2d by Estimated Date of Delivery: 10/28/19  Patient Active Problem List   Diagnosis    Hepatitis C    Tobacco abuse    Pregnancy with poor reproductive history, antepartum    IV drug abuse complicating pregnancy    Methadone use    Grand multiparity    History of postpartum hemorrhage requiring transfusion    Graves disease    PTSD (post-traumatic stress disorder)    Mental disorder affecting pregnancy, antepartum    Viral hepatitis affecting pregnancy, antepartum    Hyperthyroidism affecting pregnancy, antepartum    Encounter for sterilization    Tobacco smoking affecting pregnancy, antepartum     OB History    Para Term  AB Living   7 5 5 0 1 5   SAB TAB Ectopic Multiple Live Births   1       5      # Outcome Date GA Lbr Jacob/2nd Weight Sex Delivery Anes PTL Lv   7 Current            6 Term    3.969 kg (8 lb 12 oz) M Vag-Spont   YADI   5 Term    4.167 kg (9 lb 3 oz) F Vag-Spont   YADI   4 Term    3.515 kg (7 lb 12 oz) M Vag-Spont   YADI      Complications: Postpartum hemorrhage   3 SAB            2 Term    4.167 kg (9 lb 3 oz) F Vag-Spont   YADI      Complications: Shoulder Dystocia, Postpartum hemorrhage   1 Term    3.969 kg (8 lb 12 oz) M Vag-Spont   YADI       Dating reviewed    Allergies and problem list reviewed and updated    Medical and surgical history reviewed    Prenatal labs reviewed and updated    Physical Exam:  GEN: No distress.  CARDIO: Regular rate  PULM: Normal respiratory effort.  ABD: Soft, gravid, nontender  NEURO: Alert and  oriented    Assessment:  32 yo  at 28w2d presents for routine OB visit.    Plan:     Jocelynn was seen today for routine prenatal visit.    Diagnoses and all orders for this visit:    Pregnancy with poor reproductive history, antepartum    Tobacco smoking affecting pregnancy, antepartum    Viral hepatitis affecting pregnancy, antepartum  -     Quantiferon Gold TB  -     Ambulatory Referral to Hepatology    Mental disorder affecting pregnancy, antepartum    Hyperthyroidism affecting pregnancy, antepartum    Other orders  -     hydrOXYzine (ATARAX) 50 MG tablet; Take 0.5 tablets (25 mg total) by mouth 2 (two) times daily as needed for Anxiety.    - Labs ordered at prior visit, including OB glucose, drawn today.  - Tdap at next visit.  - RTC in 2 weeks.    Ting Neri MD  OBGYN PGY-2

## 2019-08-12 LAB
HCV RNA SERPL NAA+PROBE-LOG IU: 6.39 LOG (10) IU/ML
HCV RNA SERPL QL NAA+PROBE: DETECTED IU/ML
HCV RNA SPEC NAA+PROBE-ACNC: ABNORMAL IU/ML

## 2019-08-21 ENCOUNTER — ROUTINE PRENATAL (OUTPATIENT)
Dept: OBSTETRICS AND GYNECOLOGY | Facility: CLINIC | Age: 34
End: 2019-08-21
Payer: MEDICAID

## 2019-08-21 VITALS
SYSTOLIC BLOOD PRESSURE: 100 MMHG | WEIGHT: 164.88 LBS | DIASTOLIC BLOOD PRESSURE: 60 MMHG | BODY MASS INDEX: 30.16 KG/M2

## 2019-08-21 DIAGNOSIS — E05.90 HYPERTHYROIDISM AFFECTING PREGNANCY IN THIRD TRIMESTER: ICD-10-CM

## 2019-08-21 DIAGNOSIS — F19.10 IV DRUG ABUSE COMPLICATING PREGNANCY: ICD-10-CM

## 2019-08-21 DIAGNOSIS — O98.413 VIRAL HEPATITIS AFFECTING PREGNANCY IN THIRD TRIMESTER: ICD-10-CM

## 2019-08-21 DIAGNOSIS — O99.343 MENTAL DISORDER AFFECTING PREGNANCY IN THIRD TRIMESTER: ICD-10-CM

## 2019-08-21 DIAGNOSIS — Z23 ENCOUNTER FOR IMMUNIZATION: ICD-10-CM

## 2019-08-21 DIAGNOSIS — O99.283 HYPERTHYROIDISM AFFECTING PREGNANCY IN THIRD TRIMESTER: ICD-10-CM

## 2019-08-21 DIAGNOSIS — O09.293 PREGNANCY WITH POOR REPRODUCTIVE HISTORY IN THIRD TRIMESTER: Primary | ICD-10-CM

## 2019-08-21 DIAGNOSIS — O99.320 IV DRUG ABUSE COMPLICATING PREGNANCY: ICD-10-CM

## 2019-08-21 PROCEDURE — 99214 PR OFFICE/OUTPT VISIT, EST, LEVL IV, 30-39 MIN: ICD-10-PCS | Mod: TH,S$PBB,, | Performed by: STUDENT IN AN ORGANIZED HEALTH CARE EDUCATION/TRAINING PROGRAM

## 2019-08-21 PROCEDURE — 99999 PR PBB SHADOW E&M-EST. PATIENT-LVL II: ICD-10-PCS | Mod: PBBFAC,,, | Performed by: STUDENT IN AN ORGANIZED HEALTH CARE EDUCATION/TRAINING PROGRAM

## 2019-08-21 PROCEDURE — 99212 OFFICE O/P EST SF 10 MIN: CPT | Mod: PBBFAC,TH,PO | Performed by: STUDENT IN AN ORGANIZED HEALTH CARE EDUCATION/TRAINING PROGRAM

## 2019-08-21 PROCEDURE — 99999 PR PBB SHADOW E&M-EST. PATIENT-LVL II: CPT | Mod: PBBFAC,,, | Performed by: STUDENT IN AN ORGANIZED HEALTH CARE EDUCATION/TRAINING PROGRAM

## 2019-08-21 PROCEDURE — 90471 IMMUNIZATION ADMIN: CPT | Mod: PBBFAC,PO

## 2019-08-21 PROCEDURE — 99214 OFFICE O/P EST MOD 30 MIN: CPT | Mod: TH,S$PBB,, | Performed by: STUDENT IN AN ORGANIZED HEALTH CARE EDUCATION/TRAINING PROGRAM

## 2019-08-21 NOTE — LETTER
2019      [facility]  [address]  [facility] [address]  Phone: [phone]       Patient: Jocelynn Fowler   YOB: 1985  Date of Visit: 2019    To Whom It May Concern:    Celia Fowler  was at [facility] on 2019. She has been counseled on the risks of taking benzodiazepines during pregnancy, including  transmission. There is no long term data on  risks. We do not recommend increasing dose of benzodiazepines in the third trimester. Patient desires to continue her dose of klonopin after acknowledging potential risks.  If you have any questions or concerns, or if I can be of further assistance, please do not hesitate to contact me.    Sincerely,          Ting Neri MD

## 2019-08-21 NOTE — PROGRESS NOTES
Complaints today: Feeling very jittery since coming off of her klonopin. She tried taking vistaril with no relief. She states that the vistaril makes her feel like a zombie. She has been getting her methadone without difficulty.    /60   Wt 74.8 kg (164 lb 14.5 oz)   LMP 2019   BMI 30.16 kg/m²     33 y.o., at 30w2d by Estimated Date of Delivery: 10/28/19  Patient Active Problem List   Diagnosis    Hepatitis C    Tobacco abuse    Pregnancy with poor reproductive history, antepartum    IV drug abuse complicating pregnancy    Methadone use    Grand multiparity    History of postpartum hemorrhage requiring transfusion    Graves disease    PTSD (post-traumatic stress disorder)    Mental disorder affecting pregnancy, antepartum    Viral hepatitis affecting pregnancy, antepartum    Hyperthyroidism affecting pregnancy, antepartum    Encounter for sterilization    Tobacco smoking affecting pregnancy, antepartum     OB History    Para Term  AB Living   7 5 5 0 1 5   SAB TAB Ectopic Multiple Live Births   1       5      # Outcome Date GA Lbr Jacob/2nd Weight Sex Delivery Anes PTL Lv   7 Current            6 Term    3.969 kg (8 lb 12 oz) M Vag-Spont   YADI   5 Term    4.167 kg (9 lb 3 oz) F Vag-Spont   YADI   4 Term    3.515 kg (7 lb 12 oz) M Vag-Spont   YADI      Complications: Postpartum hemorrhage   3 SAB            2 Term    4.167 kg (9 lb 3 oz) F Vag-Spont   YADI      Complications: Shoulder Dystocia, Postpartum hemorrhage   1 Term    3.969 kg (8 lb 12 oz) M Vag-Spont   YADI       Dating reviewed    Allergies and problem list reviewed and updated    Medical and surgical history reviewed    Prenatal labs reviewed and updated    Physical Exam:  GEN: No distress.  CARDIO: Regular rate  PULM: Normal respiratory effort.  ABD: Soft, gravid, nontender  NEURO: Alert and oriented      Jocelynn was seen today for routine prenatal visit.    Diagnoses and all orders for this  visit:    Pregnancy with poor reproductive history, antepartum    Viral hepatitis affecting pregnancy, antepartum   - Will refer to hepatology at [facility] postpartum    Hyperthyroidism affecting pregnancy, antepartum   - TPO antibodies positive. Will need to notify peds at delivery.    Other orders  -     (In Office Administered) Tdap Vaccine  -     Letter given to patient to bring to psychiatrist's office regarding continued use of klonopin.      Ting Neri MD  OBGYN PGY-2

## 2019-09-17 NOTE — PROGRESS NOTES
Seen and examined.  Agree with note.  All questions answered.    1. Pregnancy with poor reproductive history in third trimester     2. Viral hepatitis affecting pregnancy in third trimester     3. Hyperthyroidism affecting pregnancy in third trimester     4. Mental disorder affecting pregnancy in third trimester     5. Encounter for immunization  (In Office Administered) Tdap Vaccine   6. IV drug abuse complicating pregnancy

## 2019-10-04 ENCOUNTER — TELEPHONE (OUTPATIENT)
Dept: OBSTETRICS AND GYNECOLOGY | Facility: CLINIC | Age: 34
End: 2019-10-04

## 2019-10-04 NOTE — TELEPHONE ENCOUNTER
Called [phone] and left message for a return call to verify info about a medication not named.

## 2019-10-04 NOTE — TELEPHONE ENCOUNTER
----- Message from Ale Huang, Patient Care Assistant sent at 10/4/2019 10:30 AM CDT -----  Contact: Dr. Haley (psychiatric doctor)  Name of Who is Calling: Dr. Haley     What is the request in detail:Requesting a call back in regards of consulting about a medication.No other information was given.  Please contact to further discuss and advise      Can the clinic reply by [facility]: No    What Number to Call Back if not in [facility]:   [phone]

## 2019-10-04 NOTE — TELEPHONE ENCOUNTER
----- Message from Huma Conde sent at 10/4/2019  3:38 PM CDT -----  Contact: Dr. aCrdona  Type:  Patient Returning Call    Who Called: Dr. Cardona    Who Left Message for Patient: Juliet FLORENCETereso    Does the patient know what this is regarding?: Yes, return call from the clinic staff.    Best Call Back Number: [phone]    Additional Information: None

## 2019-10-04 NOTE — TELEPHONE ENCOUNTER
Sent a message via epic to Dr Roche for Dr Cardona to get a call at [phone] in regards to patient's medication.

## 2019-10-29 ENCOUNTER — ANESTHESIA (OUTPATIENT)
Dept: OBSTETRICS AND GYNECOLOGY | Facility: OTHER | Age: 34
End: 2019-10-29
Payer: MEDICAID

## 2019-10-29 ENCOUNTER — ANESTHESIA EVENT (OUTPATIENT)
Dept: OBSTETRICS AND GYNECOLOGY | Facility: OTHER | Age: 34
End: 2019-10-29
Payer: MEDICAID

## 2019-10-29 ENCOUNTER — HOSPITAL ENCOUNTER (INPATIENT)
Facility: OTHER | Age: 34
LOS: 2 days | Discharge: HOME | End: 2019-10-31
Attending: OBSTETRICS & GYNECOLOGY | Admitting: OBSTETRICS & GYNECOLOGY
Payer: MEDICAID

## 2019-10-29 DIAGNOSIS — Z64.1 GRAND MULTIPARITY: ICD-10-CM

## 2019-10-29 DIAGNOSIS — F11.90 METHADONE USE: ICD-10-CM

## 2019-10-29 DIAGNOSIS — Z3A.40 40 WEEKS GESTATION OF PREGNANCY: Primary | ICD-10-CM

## 2019-10-29 DIAGNOSIS — E05.90 HYPERTHYROIDISM AFFECTING PREGNANCY, ANTEPARTUM: ICD-10-CM

## 2019-10-29 DIAGNOSIS — Z30.09 UNWANTED FERTILITY: ICD-10-CM

## 2019-10-29 DIAGNOSIS — F19.10 IV DRUG ABUSE COMPLICATING PREGNANCY: ICD-10-CM

## 2019-10-29 DIAGNOSIS — O99.280 HYPERTHYROIDISM AFFECTING PREGNANCY, ANTEPARTUM: ICD-10-CM

## 2019-10-29 DIAGNOSIS — B17.10 ACUTE HEPATITIS C VIRUS INFECTION WITHOUT HEPATIC COMA: ICD-10-CM

## 2019-10-29 DIAGNOSIS — O99.320 IV DRUG ABUSE COMPLICATING PREGNANCY: ICD-10-CM

## 2019-10-29 LAB
ABO + RH BLD: NORMAL
AMPHET+METHAMPHET UR QL: NEGATIVE
BARBITURATES UR QL SCN>200 NG/ML: NEGATIVE
BASOPHILS # BLD AUTO: 0.02 K/UL (ref 0–0.2)
BASOPHILS NFR BLD: 0.2 % (ref 0–1.9)
BENZODIAZ UR QL SCN>200 NG/ML: NORMAL
BLD GP AB SCN CELLS X3 SERPL QL: NORMAL
BZE UR QL SCN: NEGATIVE
CANNABINOIDS UR QL SCN: NEGATIVE
CREAT UR-MCNC: 96.3 MG/DL (ref 15–325)
DIFFERENTIAL METHOD: ABNORMAL
EOSINOPHIL # BLD AUTO: 0 K/UL (ref 0–0.5)
EOSINOPHIL NFR BLD: 0 % (ref 0–8)
ERYTHROCYTE [DISTWIDTH] IN BLOOD BY AUTOMATED COUNT: 15.9 % (ref 11.5–14.5)
ETHANOL UR-MCNC: <10 MG/DL
HCT VFR BLD AUTO: 25.2 % (ref 37–48.5)
HGB BLD-MCNC: 7.8 G/DL (ref 12–16)
HIV 1+2 AB+HIV1 P24 AG SERPL QL IA: NEGATIVE
HIV1+2 IGG SERPL QL IA.RAPID: NEGATIVE
IMM GRANULOCYTES # BLD AUTO: 0.05 K/UL (ref 0–0.04)
IMM GRANULOCYTES NFR BLD AUTO: 0.6 % (ref 0–0.5)
LYMPHOCYTES # BLD AUTO: 1.4 K/UL (ref 1–4.8)
LYMPHOCYTES NFR BLD: 16.3 % (ref 18–48)
MCH RBC QN AUTO: 23.4 PG (ref 27–31)
MCHC RBC AUTO-ENTMCNC: 31 G/DL (ref 32–36)
MCV RBC AUTO: 75 FL (ref 82–98)
METHADONE UR QL SCN>300 NG/ML: NORMAL
MONOCYTES # BLD AUTO: 0.3 K/UL (ref 0.3–1)
MONOCYTES NFR BLD: 3 % (ref 4–15)
NEUTROPHILS # BLD AUTO: 6.7 K/UL (ref 1.8–7.7)
NEUTROPHILS NFR BLD: 79.9 % (ref 38–73)
NRBC BLD-RTO: 0 /100 WBC
OPIATES UR QL SCN: NORMAL
PCP UR QL SCN>25 NG/ML: NEGATIVE
PLATELET # BLD AUTO: 298 K/UL (ref 150–350)
PMV BLD AUTO: 10.8 FL (ref 9.2–12.9)
RBC # BLD AUTO: 3.34 M/UL (ref 4–5.4)
TOXICOLOGY INFORMATION: NORMAL
WBC # BLD AUTO: 8.42 K/UL (ref 3.9–12.7)

## 2019-10-29 PROCEDURE — 63600175 PHARM REV CODE 636 W HCPCS: Performed by: STUDENT IN AN ORGANIZED HEALTH CARE EDUCATION/TRAINING PROGRAM

## 2019-10-29 PROCEDURE — 86901 BLOOD TYPING SEROLOGIC RH(D): CPT

## 2019-10-29 PROCEDURE — 86703 HIV-1/HIV-2 1 RESULT ANTBDY: CPT | Mod: 91

## 2019-10-29 PROCEDURE — 62326 NJX INTERLAMINAR LMBR/SAC: CPT | Performed by: STUDENT IN AN ORGANIZED HEALTH CARE EDUCATION/TRAINING PROGRAM

## 2019-10-29 PROCEDURE — 59025 FETAL NON-STRESS TEST: CPT

## 2019-10-29 PROCEDURE — 25000003 PHARM REV CODE 250: Performed by: OBSTETRICS & GYNECOLOGY

## 2019-10-29 PROCEDURE — 86592 SYPHILIS TEST NON-TREP QUAL: CPT

## 2019-10-29 PROCEDURE — 25000003 PHARM REV CODE 250: Performed by: STUDENT IN AN ORGANIZED HEALTH CARE EDUCATION/TRAINING PROGRAM

## 2019-10-29 PROCEDURE — 86920 COMPATIBILITY TEST SPIN: CPT

## 2019-10-29 PROCEDURE — 27200710 HC EPIDURAL INFUSION PUMP SET: Performed by: STUDENT IN AN ORGANIZED HEALTH CARE EDUCATION/TRAINING PROGRAM

## 2019-10-29 PROCEDURE — 87081 CULTURE SCREEN ONLY: CPT

## 2019-10-29 PROCEDURE — 62326 NJX INTERLAMINAR LMBR/SAC: CPT | Performed by: ANESTHESIOLOGY

## 2019-10-29 PROCEDURE — 59409 OBSTETRICAL CARE: CPT | Mod: GB,,, | Performed by: OBSTETRICS & GYNECOLOGY

## 2019-10-29 PROCEDURE — 72100002 HC LABOR CARE, 1ST 8 HOURS

## 2019-10-29 PROCEDURE — 72200005 HC VAGINAL DELIVERY LEVEL II

## 2019-10-29 PROCEDURE — 11000001 HC ACUTE MED/SURG PRIVATE ROOM

## 2019-10-29 PROCEDURE — 59409 OBSTETRICAL CARE: CPT | Mod: AA,,, | Performed by: ANESTHESIOLOGY

## 2019-10-29 PROCEDURE — 85025 COMPLETE CBC W/AUTO DIFF WBC: CPT

## 2019-10-29 PROCEDURE — 59409 PR OBSTETRICAL CARE,VAG DELIV ONLY: ICD-10-PCS | Mod: GB,,, | Performed by: OBSTETRICS & GYNECOLOGY

## 2019-10-29 PROCEDURE — 87522 HEPATITIS C REVRS TRNSCRPJ: CPT

## 2019-10-29 PROCEDURE — 80307 DRUG TEST PRSMV CHEM ANLYZR: CPT

## 2019-10-29 PROCEDURE — C1751 CATH, INF, PER/CENT/MIDLINE: HCPCS | Performed by: STUDENT IN AN ORGANIZED HEALTH CARE EDUCATION/TRAINING PROGRAM

## 2019-10-29 PROCEDURE — 72100003 HC LABOR CARE, EA. ADDL. 8 HRS

## 2019-10-29 PROCEDURE — 59409 PRA ETRICAL CARE,VAG DELIV ONLY: ICD-10-PCS | Mod: AA,,, | Performed by: ANESTHESIOLOGY

## 2019-10-29 PROCEDURE — 86703 HIV-1/HIV-2 1 RESULT ANTBDY: CPT

## 2019-10-29 PROCEDURE — 99285 EMERGENCY DEPT VISIT HI MDM: CPT | Mod: 25

## 2019-10-29 PROCEDURE — 36415 COLL VENOUS BLD VENIPUNCTURE: CPT

## 2019-10-29 RX ORDER — SODIUM CITRATE AND CITRIC ACID MONOHYDRATE 334; 500 MG/5ML; MG/5ML
30 SOLUTION ORAL ONCE
Status: CANCELLED | OUTPATIENT
Start: 2019-10-29 | End: 2019-10-29

## 2019-10-29 RX ORDER — ONDANSETRON 8 MG/1
8 TABLET, ORALLY DISINTEGRATING ORAL EVERY 8 HOURS PRN
Status: DISCONTINUED | OUTPATIENT
Start: 2019-10-29 | End: 2019-10-31 | Stop reason: HOSPADM

## 2019-10-29 RX ORDER — SODIUM CHLORIDE, SODIUM LACTATE, POTASSIUM CHLORIDE, CALCIUM CHLORIDE 600; 310; 30; 20 MG/100ML; MG/100ML; MG/100ML; MG/100ML
INJECTION, SOLUTION INTRAVENOUS CONTINUOUS
Status: DISCONTINUED | OUTPATIENT
Start: 2019-10-30 | End: 2019-10-31 | Stop reason: HOSPADM

## 2019-10-29 RX ORDER — SIMETHICONE 80 MG
1 TABLET,CHEWABLE ORAL 4 TIMES DAILY PRN
Status: DISCONTINUED | OUTPATIENT
Start: 2019-10-29 | End: 2019-10-31 | Stop reason: HOSPADM

## 2019-10-29 RX ORDER — DIPHENHYDRAMINE HYDROCHLORIDE 50 MG/ML
25 INJECTION INTRAMUSCULAR; INTRAVENOUS EVERY 4 HOURS PRN
Status: DISCONTINUED | OUTPATIENT
Start: 2019-10-29 | End: 2019-10-30

## 2019-10-29 RX ORDER — CLONAZEPAM 0.5 MG/1
1 TABLET ORAL 3 TIMES DAILY PRN
Status: DISCONTINUED | OUTPATIENT
Start: 2019-10-29 | End: 2019-10-31

## 2019-10-29 RX ORDER — FAMOTIDINE 10 MG/ML
20 INJECTION INTRAVENOUS ONCE
Status: CANCELLED | OUTPATIENT
Start: 2019-10-29 | End: 2019-10-29

## 2019-10-29 RX ORDER — OXYTOCIN/RINGER'S LACTATE 30/500 ML
95 PLASTIC BAG, INJECTION (ML) INTRAVENOUS ONCE
Status: DISCONTINUED | OUTPATIENT
Start: 2019-10-29 | End: 2019-10-30

## 2019-10-29 RX ORDER — BUPIVACAINE HYDROCHLORIDE 2.5 MG/ML
INJECTION, SOLUTION EPIDURAL; INFILTRATION; INTRACAUDAL
Status: DISPENSED
Start: 2019-10-29 | End: 2019-10-30

## 2019-10-29 RX ORDER — SODIUM CHLORIDE 9 MG/ML
INJECTION, SOLUTION INTRAVENOUS
Status: DISCONTINUED | OUTPATIENT
Start: 2019-10-29 | End: 2019-10-30

## 2019-10-29 RX ORDER — AMOXICILLIN 250 MG
1 CAPSULE ORAL NIGHTLY
Status: DISCONTINUED | OUTPATIENT
Start: 2019-10-29 | End: 2019-10-31 | Stop reason: HOSPADM

## 2019-10-29 RX ORDER — FENTANYL/BUPIVACAINE/NS/PF 2MCG/ML-.1
PLASTIC BAG, INJECTION (ML) INJECTION
Status: DISPENSED
Start: 2019-10-29 | End: 2019-10-30

## 2019-10-29 RX ORDER — DIPHENHYDRAMINE HCL 25 MG
25 CAPSULE ORAL EVERY 4 HOURS PRN
Status: DISCONTINUED | OUTPATIENT
Start: 2019-10-29 | End: 2019-10-31 | Stop reason: HOSPADM

## 2019-10-29 RX ORDER — HYDROCODONE BITARTRATE AND ACETAMINOPHEN 500; 5 MG/1; MG/1
TABLET ORAL
Status: DISCONTINUED | OUTPATIENT
Start: 2019-10-29 | End: 2019-10-30

## 2019-10-29 RX ORDER — HYDROCORTISONE 25 MG/G
CREAM TOPICAL 3 TIMES DAILY PRN
Status: DISCONTINUED | OUTPATIENT
Start: 2019-10-29 | End: 2019-10-31 | Stop reason: HOSPADM

## 2019-10-29 RX ORDER — ONDANSETRON 8 MG/1
8 TABLET, ORALLY DISINTEGRATING ORAL EVERY 8 HOURS PRN
Status: CANCELLED | OUTPATIENT
Start: 2019-10-29

## 2019-10-29 RX ORDER — IBUPROFEN 600 MG/1
600 TABLET ORAL EVERY 6 HOURS
Status: DISCONTINUED | OUTPATIENT
Start: 2019-10-30 | End: 2019-10-31 | Stop reason: HOSPADM

## 2019-10-29 RX ORDER — FENTANYL CITRATE 50 UG/ML
INJECTION, SOLUTION INTRAMUSCULAR; INTRAVENOUS
Status: DISCONTINUED | OUTPATIENT
Start: 2019-10-29 | End: 2019-10-29

## 2019-10-29 RX ORDER — CALCIUM CARBONATE 200(500)MG
500 TABLET,CHEWABLE ORAL 3 TIMES DAILY PRN
Status: DISCONTINUED | OUTPATIENT
Start: 2019-10-29 | End: 2019-10-31 | Stop reason: HOSPADM

## 2019-10-29 RX ORDER — OXYTOCIN/RINGER'S LACTATE 30/500 ML
41.65 PLASTIC BAG, INJECTION (ML) INTRAVENOUS CONTINUOUS
Status: DISCONTINUED | OUTPATIENT
Start: 2019-10-29 | End: 2019-10-30

## 2019-10-29 RX ORDER — ACETAMINOPHEN 325 MG/1
650 TABLET ORAL EVERY 6 HOURS PRN
Status: DISCONTINUED | OUTPATIENT
Start: 2019-10-29 | End: 2019-10-31 | Stop reason: HOSPADM

## 2019-10-29 RX ORDER — DOCUSATE SODIUM 100 MG/1
200 CAPSULE, LIQUID FILLED ORAL 2 TIMES DAILY PRN
Status: DISCONTINUED | OUTPATIENT
Start: 2019-10-29 | End: 2019-10-31 | Stop reason: HOSPADM

## 2019-10-29 RX ORDER — SODIUM CHLORIDE, SODIUM LACTATE, POTASSIUM CHLORIDE, CALCIUM CHLORIDE 600; 310; 30; 20 MG/100ML; MG/100ML; MG/100ML; MG/100ML
INJECTION, SOLUTION INTRAVENOUS CONTINUOUS
Status: DISCONTINUED | OUTPATIENT
Start: 2019-10-29 | End: 2019-10-29

## 2019-10-29 RX ORDER — LORAZEPAM 0.5 MG/1
0.5 TABLET ORAL ONCE
Status: COMPLETED | OUTPATIENT
Start: 2019-10-29 | End: 2019-10-29

## 2019-10-29 RX ORDER — FENTANYL/BUPIVACAINE/NS/PF 2MCG/ML-.1
PLASTIC BAG, INJECTION (ML) INJECTION CONTINUOUS
Status: CANCELLED | OUTPATIENT
Start: 2019-10-29

## 2019-10-29 RX ORDER — OXYTOCIN/RINGER'S LACTATE 30/500 ML
2 PLASTIC BAG, INJECTION (ML) INTRAVENOUS CONTINUOUS
Status: DISCONTINUED | OUTPATIENT
Start: 2019-10-29 | End: 2019-10-31 | Stop reason: HOSPADM

## 2019-10-29 RX ORDER — FENTANYL CITRATE 50 UG/ML
INJECTION, SOLUTION INTRAMUSCULAR; INTRAVENOUS
Status: COMPLETED
Start: 2019-10-29 | End: 2019-10-29

## 2019-10-29 RX ORDER — FLUOXETINE HYDROCHLORIDE 20 MG/1
20 CAPSULE ORAL DAILY
Status: DISCONTINUED | OUTPATIENT
Start: 2019-10-29 | End: 2019-10-31 | Stop reason: HOSPADM

## 2019-10-29 RX ORDER — FENTANYL/BUPIVACAINE/NS/PF 2MCG/ML-.1
PLASTIC BAG, INJECTION (ML) INJECTION CONTINUOUS PRN
Status: DISCONTINUED | OUTPATIENT
Start: 2019-10-29 | End: 2019-10-29

## 2019-10-29 RX ORDER — OXYTOCIN/RINGER'S LACTATE 30/500 ML
334 PLASTIC BAG, INJECTION (ML) INTRAVENOUS ONCE
Status: DISCONTINUED | OUTPATIENT
Start: 2019-10-29 | End: 2019-10-30

## 2019-10-29 RX ORDER — HYDROXYZINE HYDROCHLORIDE 25 MG/1
25 TABLET, FILM COATED ORAL 2 TIMES DAILY PRN
Status: DISCONTINUED | OUTPATIENT
Start: 2019-10-29 | End: 2019-10-31 | Stop reason: HOSPADM

## 2019-10-29 RX ORDER — METHADONE HYDROCHLORIDE 10 MG/1
50 TABLET ORAL EVERY 12 HOURS
Status: DISCONTINUED | OUTPATIENT
Start: 2019-10-29 | End: 2019-10-31 | Stop reason: HOSPADM

## 2019-10-29 RX ADMIN — SENNOSIDES,DOCUSATE SODIUM 1 TABLET: 8.6; 5 TABLET, FILM COATED ORAL at 09:10

## 2019-10-29 RX ADMIN — METHADONE HYDROCHLORIDE 50 MG: 10 TABLET ORAL at 09:10

## 2019-10-29 RX ADMIN — METHADONE HYDROCHLORIDE 50 MG: 10 TABLET ORAL at 10:10

## 2019-10-29 RX ADMIN — Medication 10 ML/HR: at 01:10

## 2019-10-29 RX ADMIN — SODIUM CHLORIDE, SODIUM LACTATE, POTASSIUM CHLORIDE, AND CALCIUM CHLORIDE: .6; .31; .03; .02 INJECTION, SOLUTION INTRAVENOUS at 11:10

## 2019-10-29 RX ADMIN — HYDROXYZINE HYDROCHLORIDE 25 MG: 25 TABLET, FILM COATED ORAL at 01:10

## 2019-10-29 RX ADMIN — CLONAZEPAM 1 MG: 0.5 TABLET ORAL at 10:10

## 2019-10-29 RX ADMIN — LORAZEPAM 0.5 MG: 0.5 TABLET ORAL at 04:10

## 2019-10-29 RX ADMIN — Medication 2 MILLI-UNITS/MIN: at 11:10

## 2019-10-29 RX ADMIN — FLUOXETINE 20 MG: 20 CAPSULE ORAL at 01:10

## 2019-10-29 RX ADMIN — FENTANYL CITRATE 100 MCG: 50 INJECTION, SOLUTION INTRAMUSCULAR; INTRAVENOUS at 01:10

## 2019-10-29 RX ADMIN — Medication 10 ML: at 01:10

## 2019-10-29 NOTE — ANESTHESIA PREPROCEDURE EVALUATION
[facility], [place], operated by [facility]  Anesthesia Obstetric Pre-Procedure Evaluation         Patient Name: Jocelynn Fowler  YOB: 1985  MRN: [id number]    SUBJECTIVE:     Pre-operative evaluation for * No surgery found *     10/29/2019    Jocelynn Fowler is a 34 y.o. female, currently a  at 40w1d. Patient presents to [facility] with contractions. PMHx of IV drug abuse on methadone (90mg daily), hep C, hypothyroidism, anxiety, depression. Pregnancy uncomplicated thus far.       Lab Results   Component Value Date     10/29/2019       OB History    Para Term  AB Living   7 5 5 0 1 5   SAB TAB Ectopic Multiple Live Births   1       5      # Outcome Date GA Lbr Jacob/2nd Weight Sex Delivery Anes PTL Lv   7 Current            6 Term    3.969 kg (8 lb 12 oz) M Vag-Spont   YADI   5 Term    4.167 kg (9 lb 3 oz) F Vag-Spont   YADI   4 Term    3.515 kg (7 lb 12 oz) M Vag-Spont   YADI      Complications: Postpartum hemorrhage   3 SAB            2 Term    4.167 kg (9 lb 3 oz) F Vag-Spont   YADI      Complications: Shoulder Dystocia, Postpartum hemorrhage   1 Term    3.969 kg (8 lb 12 oz) M Vag-Spont   YADI       Patient Active Problem List   Diagnosis    Hepatitis C    Tobacco abuse    Pregnancy with poor reproductive history, antepartum    IV drug abuse complicating pregnancy    Methadone use    Grand multiparity    History of postpartum hemorrhage requiring transfusion    Graves disease    PTSD (post-traumatic stress disorder)    Mental disorder affecting pregnancy, antepartum    Viral hepatitis affecting pregnancy, antepartum    Hyperthyroidism affecting pregnancy, antepartum    Encounter for sterilization    Tobacco smoking affecting pregnancy, antepartum    Indication for care in labor and delivery, antepartum    40 weeks gestation of  pregnancy       Review of patient's allergies indicates:   Allergen Reactions    Toradol [ketorolac] Hives       Current Outpatient Medications:    Current Facility-Administered Medications:     0.9%  NaCl infusion (for blood administration), , Intravenous, Q24H PRN, Cherelle Yusuf MD    0.9%  NaCl infusion, , Intra-Catheter, PRN, Lorraine Sullivan MD    calcium carbonate 200 mg calcium (500 mg) chewable tablet 500 mg, 500 mg, Oral, TID PRN, Lorraine Sullivan MD    FLUoxetine capsule 20 mg, 20 mg, Oral, Daily, Lorraine Sullivan MD    hydrOXYzine HCl tablet 25 mg, 25 mg, Oral, BID PRN, Lorraine Sullivan MD    lactated ringers bolus 1,000 mL, 1,000 mL, Intravenous, PRN, Lorraine Sullivan MD    lactated ringers infusion, , Intravenous, Continuous, Lorraine Sullivan MD, Last Rate: 125 mL/hr at 10/29/19 1107    methadone tablet 50 mg, 50 mg, Oral, Q12H, Maryann H Mellissa, DO, 50 mg at 10/29/19 0916    ondansetron disintegrating tablet 8 mg, 8 mg, Oral, Q8H PRN, Lorraine Sullivan MD    oxytocin 30 units in 500 mL lactated ringers infusion (non-titrating), 334 surya-units/min, Intravenous, Once, Lorraine Sullivan MD    oxytocin 30 units in 500 mL lactated ringers infusion (non-titrating), 95 surya-units/min, Intravenous, Once, Lorraine Sullivan MD    oxytocin 30 units in 500 mL lactated ringers infusion (titrating), 2 surya-units/min, Intravenous, Continuous, Cherelle Yusuf MD, Last Rate: 4 mL/hr at 10/29/19 1145, 4 surya-units/min at 10/29/19 1145    penicillin G potassium 2.5 Million Units in dextrose 5 % 50 mL IVPB, 2.5 Million Units, Intravenous, Q4H, Lorraine Sullivan MD    penicillin G potassium 5 Million Units in dextrose 5 % 100 mL IVPB (ready to mix system), 5 Million Units, Intravenous, Once, Lorraine Sullivan MD    promethazine (PHENERGAN) 12.5 mg in dextrose 5 % 50 mL IVPB, 12.5 mg, Intravenous, Q6H PRN, Lorraine Sullivan MD    simethicone chewable tablet 80 mg, 1 tablet, Oral, QID PRN, Lorraine BECERRA  MD Nate    No past surgical history on file.    Social History     Socioeconomic History    Marital status: Single     Spouse name: Not on file    Number of children: Not on file    Years of education: Not on file    Highest education level: Not on file   Occupational History    Not on file   Social Needs    Financial resource strain: Not on file    Food insecurity:     Worry: Not on file     Inability: Not on file    Transportation needs:     Medical: Not on file     Non-medical: Not on file   Tobacco Use    Smoking status: Current Every Day Smoker     Packs/day: 0.50     Types: Cigarettes    Smokeless tobacco: Never Used   Substance and Sexual Activity    Alcohol use: No    Drug use: No     Comment: heroin past    Sexual activity: Yes     Partners: Male     Birth control/protection: None   Lifestyle    Physical activity:     Days per week: Not on file     Minutes per session: Not on file    Stress: Not on file   Relationships    Social connections:     Talks on phone: Not on file     Gets together: Not on file     Attends [employer] service: Not on file     Active member of club or organization: Not on file     Attends meetings of clubs or organizations: Not on file     Relationship status: Not on file   Other Topics Concern    Not on file   Social History Narrative    Not on file       OBJECTIVE:     Vital Signs Range (Last 24H):  Temp:  [36.3 °C (97.3 °F)]   Pulse:  [61-81]   Resp:  [17-18]   BP: (107-109)/(61-67)   SpO2:  [89 %-100 %]       Significant Labs:  Lab Results   Component Value Date    WBC 8.42 10/29/2019    HGB 7.8 (L) 10/29/2019    HCT 25.2 (L) 10/29/2019     10/29/2019    ALT 49 (H) 08/07/2019    AST 60 (H) 08/07/2019     (L) 08/07/2019    K 3.7 08/07/2019     08/07/2019    CREATININE 0.7 08/07/2019    BUN 6 08/07/2019    CO2 23 08/07/2019    TSH 1.228 08/07/2019       Diagnostic Studies: No relevant studies.    EKG:   Results for orders placed or performed  during the hospital encounter of 08/06/13   EKG 12-LEAD    Collection Time: 08/06/13  1:01 AM       2D ECHO:  TTE:  No results found for this or any previous visit.    ANYA:  No results found for this or any previous visit.    ASSESSMENT/PLAN:         Anesthesia Evaluation    I have reviewed the Patient Summary Reports.    I have reviewed the Nursing Notes.   I have reviewed the Medications.     Review of Systems  Anesthesia Hx:  No problems with previous Anesthesia Denies Hx of Anesthetic complications  History of prior surgery of interest to airway management or planning:  Denies Personal Hx of Anesthesia complications.   Social:  IV drug use, on methadone 90mg daily   Cardiovascular:  Cardiovascular Normal  Denies Hypertension.     Pulmonary:  Pulmonary Normal  Denies Asthma.    Renal/:  Renal/ Normal     Hepatic/GI:   Hepatitis, C    Neurological:  Neurology Normal    Endocrine:   Hypothyroidism    Psych:   anxiety depression          Physical Exam  General:  Well nourished    Airway/Jaw/Neck:  Airway Findings: Mouth Opening: Normal Tongue: Normal  General Airway Assessment: Adult  Mallampati: III  Improves to II with phonation.  TM Distance: Normal, at least 6 cm      Dental:  Dental Findings: In tact   Chest/Lungs:  Chest/Lungs Findings: Clear to auscultation, Normal Respiratory Rate     Heart/Vascular:  Heart Findings: Rate: Normal  Rhythm: Regular Rhythm     Abdomen:  Abdomen Findings: Normal    Musculoskeletal:  Musculoskeletal Findings: Normal    Mental Status:  Mental Status Findings:  Cooperative, Alert and Oriented         Anesthesia Plan  Type of Anesthesia, risks & benefits discussed:  Anesthesia Type:  general  Patient's Preference:   Intra-op Monitoring Plan: standard ASA monitors  Intra-op Monitoring Plan Comments:   Post Op Pain Control Plan: multimodal analgesia, IV/PO Opioids PRN and per primary service following discharge from PACU  Post Op Pain Control Plan Comments:   Induction:   IV  Beta  Blocker:  Patient is not currently on a Beta-Blocker (No further documentation required).       Informed Consent: Patient understands risks and agrees with Anesthesia plan.  Questions answered. Anesthesia consent signed with patient.  ASA Score: 3     Day of Surgery Review of History & Physical:            Ready For Surgery From Anesthesia Perspective.

## 2019-10-29 NOTE — ED PROVIDER NOTES
Encounter Date: 10/29/2019       History     Chief Complaint   Patient presents with    Contractions     Jocelynn is a 35yo  at 40.1wga here for contractions that started early this morning.  No gush of fluid or vaginal bleeding.  Normal fetal movement.    PNC has been at [facility] and complicated by hepatitis C, hypothyroid, IV drug abuse on methadone, anxiety and depression. Also desires pp tubal ligation, consents previously signed        Review of patient's allergies indicates:   Allergen Reactions    Toradol [ketorolac] Hives     Past Medical History:   Diagnosis Date    Anemia     Anxiety     Depression     Encounter for blood transfusion     Hep C w/o coma, chronic     Hypothyroid     IV drug user     Ovarian cyst     Renal disorder     Substance abuse      No past surgical history on file.  Family History   Problem Relation Age of Onset    Heart disease Maternal Grandmother     Breast cancer Maternal Grandmother     Heart disease Maternal Grandfather     Heart disease Paternal Grandmother     Heart disease Paternal Grandfather     Ovarian cancer Neg Hx     Colon cancer Neg Hx      Social History     Tobacco Use    Smoking status: Current Every Day Smoker     Packs/day: 0.50     Types: Cigarettes    Smokeless tobacco: Never Used   Substance Use Topics    Alcohol use: No    Drug use: No     Comment: heroin past     Review of Systems   Constitutional: Negative for activity change, appetite change, chills and fever.   Eyes: Negative for pain and visual disturbance.   Respiratory: Negative for shortness of breath.    Cardiovascular: Negative for leg swelling.   Gastrointestinal: Negative for constipation, diarrhea, nausea and vomiting.   Genitourinary: Negative for vaginal bleeding and vaginal discharge.   Neurological: Negative for seizures, speech difficulty and headaches.       Physical Exam     Initial Vitals   BP Pulse Resp Temp SpO2   10/29/19 0735 10/29/19 0734 10/29/19 0735 10/29/19  0740 10/29/19 0734   109/67 81 17 97.3 °F (36.3 °C) 100 %      MAP       --                Physical Exam    Vitals reviewed.  Constitutional: She appears well-developed and well-nourished. She is not diaphoretic. No distress.   HENT:   Head: Normocephalic and atraumatic.   Eyes: Conjunctivae are normal. Right eye exhibits no discharge. Left eye exhibits no discharge.   Neck: Neck supple.   Cardiovascular: Normal rate.   Pulmonary/Chest: No respiratory distress.   Abdominal: Soft. There is no tenderness. There is no rebound and no guarding.   Gravid, fundus NT   Musculoskeletal: She exhibits no edema.   Neurological: She is alert and oriented to person, place, and time.   Skin: Skin is warm and dry. No rash noted. No erythema.   Psychiatric: She has a normal mood and affect. Her behavior is normal. Judgment and thought content normal.     OB LABOR EXAM:   Pre-Term Labor: No.   Membranes ruptured: No.   Method: Sterile vaginal exam per MD.   Vaginal Bleeding: none present.   Engagement: ballotable/floating.   Dilatation: 4.   Station: +3.   Effacement: 90%.   Amniotic Fluid Color: no fluid.           ED Course   Obtain Fetal nonstress test (NST)  Date/Time: 10/29/2019 8:07 AM  Performed by: Maryann Malloy DO  Authorized by: Maryann Malloy DO     Nonstress Test:     Variability:  6-25 BPM    Decelerations:  Variable    Accelerations:  15 bpm    Baseline:  125    Contraction Frequency:  2-5  Biophysical Profile:     Nonstress Test Interpretation: reactive      Overall Impression:  Reassuring      Labs Reviewed - No data to display       Imaging Results    None          Medical Decision Making:   ED Management:  Due to patients risk factors will admit to labor and delivery                   ED Course as of Oct 29 0854   Tue Oct 29, 2019   0853 Pt brought paperwork from methadone clinic documenting methadone 50mg bid, d/w Dr. Meredith.  Will give scheduled dose now    [HU]      ED Course User Index  [HU] Maryann NEWMAN  DO Mellissa     Clinical Impression:       ICD-10-CM ICD-9-CM   1. 40 weeks gestation of pregnancy Z3A.40 V22.2   2. Indication for care in labor and delivery, antepartum O75.9 659.93   3. Hyperthyroidism affecting pregnancy, antepartum O99.280 648.13    E05.90    4. Grand multiparity Z64.1 V61.5   5. Acute hepatitis C virus infection without hepatic coma B17.10 070.51   6. Methadone use F11.20 304.00   7. IV drug abuse complicating pregnancy O99.320 648.40    F19.10 305.90         Disposition:   Disposition: Admitted  Condition: Stable         Maryann PATY Malloy DO                 Maryannher PATY Malloy   10/29/19 0822       Maryann Malloy DO  10/29/19 0854

## 2019-10-29 NOTE — ED NOTES
Patient presents to OB ED with complaints of contractions. Pt reports positive fetal movements, denies leakage of fluid and denies vaginal bleeding. Pt placed in OB ED room, urine obtained and pt placed on monitor.

## 2019-10-29 NOTE — PROGRESS NOTES
LABOR NOTE    S:  Complaints: pain w/ ctx.  Epidural working:  not applicable  MD to bedside for routine cervical check    O: /61   Pulse 61   Temp 97.3 °F (36.3 °C) (Temporal)   Resp 18   LMP 2019   SpO2 (!) 89%   Breastfeeding? No       FHT: 115 Cat 1 (reassuring), mod BTBV, +accels, no decels  CTX: q 8 minutes  SVE: /-2, AROM clr      ASSESSMENT:   34 y.o.  at 40w1d, labor    FHT reassuring    Active Hospital Problems    Diagnosis  POA    Indication for care in labor and delivery, antepartum [O75.9]  Yes    40 weeks gestation of pregnancy [Z3A.40]  Not Applicable      Resolved Hospital Problems   No resolved problems to display.     Labor course  1200: /-2, AROM clr, pit @ 4mU    PLAN:    Continue Close Maternal/Fetal Monitoring  Pitocin Augmentation per protocol  Recheck 2 hours or PRN      Jessie Winter MD   OBGYN, PGY-2

## 2019-10-29 NOTE — ANESTHESIA PROCEDURE NOTES
Epidural    Patient location during procedure: OB   Reason for block: primary anesthetic   Diagnosis: IUP   Start time: 10/29/2019 12:51 PM  Timeout: 10/29/2019 12:50 PM  End time: 10/29/2019 1:00 PM  Surgery related to: Vaginal Delivery    Staffing  Performing Provider: Bill Delgado MD  Authorizing Provider: Nereida Crowley MD        Preanesthetic Checklist  Completed: patient identified, site marked, surgical consent, pre-op evaluation, timeout performed, IV checked, risks and benefits discussed, monitors and equipment checked, anesthesia consent given, hand hygiene performed and patient being monitored  Preparation  Patient position: sitting  Prep: ChloraPrep  Patient monitoring: Pulse Ox  Epidural  Skin Anesthetic: lidocaine 1%  Skin Wheal: 3 mL  Administration type: continuous  Approach: midline  Interspace: L3-4    Injection technique: DAYNE saline  Needle and Epidural Catheter  Needle type: Tuohy   Needle gauge: 17  Needle length: 3.5 inches  Needle insertion depth: 6 cm  Catheter type: springwound  Catheter size: 19 G  Catheter at skin depth: 11 cm  Test dose: 3 mL of lidocaine 1.5% with Epi 1-to-200,000  Additional Documentation: incremental injection, negative aspiration for heme and CSF, no signs/symptoms of IV or SA injection, no significant complaints from patient, left transient paresthesia and no significant pain on injection  Needle localization: anatomical landmarks  Medications:  Volume per aspiration: 5 mL  Time between aspirations: 5 minutes  Assessment  Ease of block: easy  Patient's tolerance of the procedure: comfortable throughout block and no complaints  Additional Notes  Left transient paresthesia with first 2 cc bolus; pulled catheter back 1cm, paresthesia resolved, no further pain with injection. No inadvertent dural puncture with Tuohy.  Dural puncture not performed with spinal needle

## 2019-10-29 NOTE — H&P
HISTORY AND PHYSICAL                                                OBSTETRICS          Subjective:      Jocelynn is a 33yo  at 40.1wga here for contractions that started early this morning.  No gush of fluid or vaginal bleeding.  Normal fetal movement.    PNC has been at [facility] and complicated by hepatitis C, hypothyroid, IV drug abuse on methadone, anxiety and depression. Also desires pp tubal ligation, consents previously signed    PMHx:   Past Medical History:   Diagnosis Date    Anemia     Anxiety     Depression     Encounter for blood transfusion     Hep C w/o coma, chronic     Hypothyroid     IV drug user     Ovarian cyst     Renal disorder     Substance abuse        PSHx: No past surgical history on file.    All:   Review of patient's allergies indicates:   Allergen Reactions    Toradol [ketorolac] Hives       Meds:   (Not in a hospital admission)    SH:   Social History     Socioeconomic History    Marital status: Single     Spouse name: Not on file    Number of children: Not on file    Years of education: Not on file    Highest education level: Not on file   Occupational History    Not on file   Social Needs    Financial resource strain: Not on file    Food insecurity:     Worry: Not on file     Inability: Not on file    Transportation needs:     Medical: Not on file     Non-medical: Not on file   Tobacco Use    Smoking status: Current Every Day Smoker     Packs/day: 0.50     Types: Cigarettes    Smokeless tobacco: Never Used   Substance and Sexual Activity    Alcohol use: No    Drug use: No     Comment: heroin past    Sexual activity: Yes     Partners: Male     Birth control/protection: None   Lifestyle    Physical activity:     Days per week: Not on file     Minutes per session: Not on file    Stress: Not on file   Relationships    Social connections:     Talks on phone: Not on file     Gets together: Not on file     Attends Caoda[employer] service: Not on file     Active member  of club or organization: Not on file     Attends meetings of clubs or organizations: Not on file     Relationship status: Not on file   Other Topics Concern    Not on file   Social History Narrative    Not on file       FH:   Family History   Problem Relation Age of Onset    Heart disease Maternal Grandmother     Breast cancer Maternal Grandmother     Heart disease Maternal Grandfather     Heart disease Paternal Grandmother     Heart disease Paternal Grandfather     Ovarian cancer Neg Hx     Colon cancer Neg Hx        OBHx:   OB History    Para Term  AB Living   7 5 5 0 1 5   SAB TAB Ectopic Multiple Live Births   1 0 0 0 5      # Outcome Date GA Lbr Jacob/2nd Weight Sex Delivery Anes PTL Lv   7 Current            6 Term    3.969 kg (8 lb 12 oz) M Vag-Spont   YADI   5 Term    4.167 kg (9 lb 3 oz) F Vag-Spont   YADI   4 Term    3.515 kg (7 lb 12 oz) M Vag-Spont   YADI      Complications: Postpartum hemorrhage   3 SAB            2 Term    4.167 kg (9 lb 3 oz) F Vag-Spont   YADI      Complications: Shoulder Dystocia, Postpartum hemorrhage   1 Term    3.969 kg (8 lb 12 oz) M Vag-Spont   YADI       Objective:       /67 (BP Location: Left arm, Patient Position: Lying)   Pulse 78   Temp 97.3 °F (36.3 °C) (Temporal)   Resp 17   LMP 2019   SpO2 100%     Vitals:    10/29/19 0734 10/29/19 0735 10/29/19 0740   BP:  109/67    BP Location:  Left arm    Patient Position:  Lying    Pulse: 81 78    Resp:  17    Temp:   97.3 °F (36.3 °C)   TempSrc:   Temporal   SpO2: 100%         General:   alert, appears stated age and cooperative   Lungs:   Non-labored respirations   Heart:   regular rate and rhythm   Abdomen:  soft, nondistended   Extremities negative edema, negative erythema   FHT: Baseline 130, moderate btbv, accels present Cat 1 (reassuring)                 TOCO: Q 5 minutes   Presentations: cephalic by ultrasound   Cervix:     Dilation: 4cm    Effacement: 70%    Station:   -3    Consistency: soft    Position: anterior       Lab Review  Blood Type A POS  GBBS: unknown  Rubella: Immune  RPR: NR  HIV: negative  HepB: negative       Assessment:       40w1d weeks gestation with multiple medical problems being admitted for labor.     Active Hospital Problems    Diagnosis  POA    Indication for care in labor and delivery, antepartum [O75.9]  Yes    40 weeks gestation of pregnancy [Z3A.40]  Not Applicable      Resolved Hospital Problems   No resolved problems to display.          Plan:         1. IUP @ 40.1 weeks  - Risks, benefits, alternatives and possible complications have been discussed in detail with the patient.   - Consents signed and to chart  - Admit to Labor and Delivery unit  - Epidural per Anesthesia  - Draw CBC, T&S, 3rd trimester labs, and GBS   - Recheck in 2 hrs or PRN    2. Hep C  - 6.39 IU/mL viral load on 7/24  - Will repeat viral load     3. IV drug abuse  - Methadone ordered    4. Anxiety  - Atarax ordered     5. Depression  - Prozac ordered    6. Hypothyroid  - No meds    7. Desires infertility  - BTL consents signed    8. GBS unknown  - PCN ordered for intrapartum  - GBS swab collected    9/ H/O PPH x2  -  Medium risk for PPH  - Will have hemorrhage medications in room  - H/h 7.8/25, 2 units pRBC held    10. H/O Shoulder dystocia  - 9lb3oz baby  - Has since delivered another 9lb baby with no complications  - Will be prepared at delivery    Post-Partum Hemorrhage risk - low          Lorraine Sullivan M.D.  OBGYN PGY1      Reviewed above history.     Cherelle Yusuf MD  Ob/Gyn PGY-4

## 2019-10-29 NOTE — PROGRESS NOTES
LABOR NOTE    S:  Complaints: pain w/ ctx, feeling very anxious; Epidural working: not applicable  MD to bedside for routine cervical check    O: BP (!) 90/42   Pulse (!) 51   Temp 97.9 °F (36.6 °C) (Oral)   Resp 18   LMP 2019   SpO2 97%   Breastfeeding? No       FHT: 120 Cat 2 (reassuring), mod BTBV, +accels, occasional late decels  CTX: q 2-3 minutes  SVE:       ASSESSMENT:   34 y.o.  at 40w1d, labor    FHT reassuring    Active Hospital Problems    Diagnosis  POA    Indication for care in labor and delivery, antepartum [O75.9]  Yes    40 weeks gestation of pregnancy [Z3A.40]  Not Applicable      Resolved Hospital Problems   No resolved problems to display.     Labor Course:  1200: /-2, AROM clr, pit @ 4mU  1430: 2, IUPC placed      PLAN:  Continue Close Maternal/Fetal Monitoring  Pitocin Augmentation per protocol  IUPC in place: goal MVUs 200-250  Recheck 2 hours or PRN      Brittney Marshall M.D.  OB/GYN PGY-1

## 2019-10-30 LAB
BASOPHILS # BLD AUTO: 0.02 K/UL (ref 0–0.2)
BASOPHILS # BLD AUTO: 0.02 K/UL (ref 0–0.2)
BASOPHILS # BLD AUTO: 0.03 K/UL (ref 0–0.2)
BASOPHILS NFR BLD: 0.2 % (ref 0–1.9)
BASOPHILS NFR BLD: 0.2 % (ref 0–1.9)
BASOPHILS NFR BLD: 0.3 % (ref 0–1.9)
DIFFERENTIAL METHOD: ABNORMAL
EOSINOPHIL # BLD AUTO: 0 K/UL (ref 0–0.5)
EOSINOPHIL NFR BLD: 0.2 % (ref 0–8)
EOSINOPHIL NFR BLD: 0.2 % (ref 0–8)
EOSINOPHIL NFR BLD: 0.4 % (ref 0–8)
ERYTHROCYTE [DISTWIDTH] IN BLOOD BY AUTOMATED COUNT: 15.9 % (ref 11.5–14.5)
ERYTHROCYTE [DISTWIDTH] IN BLOOD BY AUTOMATED COUNT: 15.9 % (ref 11.5–14.5)
ERYTHROCYTE [DISTWIDTH] IN BLOOD BY AUTOMATED COUNT: 16.8 % (ref 11.5–14.5)
HCT VFR BLD AUTO: 25 % (ref 37–48.5)
HCT VFR BLD AUTO: 25 % (ref 37–48.5)
HCT VFR BLD AUTO: 28.4 % (ref 37–48.5)
HGB BLD-MCNC: 7.5 G/DL (ref 12–16)
HGB BLD-MCNC: 7.5 G/DL (ref 12–16)
HGB BLD-MCNC: 8.7 G/DL (ref 12–16)
IMM GRANULOCYTES # BLD AUTO: 0.04 K/UL (ref 0–0.04)
IMM GRANULOCYTES NFR BLD AUTO: 0.4 % (ref 0–0.5)
LYMPHOCYTES # BLD AUTO: 2.4 K/UL (ref 1–4.8)
LYMPHOCYTES # BLD AUTO: 2.4 K/UL (ref 1–4.8)
LYMPHOCYTES # BLD AUTO: 2.6 K/UL (ref 1–4.8)
LYMPHOCYTES NFR BLD: 23.6 % (ref 18–48)
LYMPHOCYTES NFR BLD: 23.6 % (ref 18–48)
LYMPHOCYTES NFR BLD: 27.7 % (ref 18–48)
MCH RBC QN AUTO: 23.3 PG (ref 27–31)
MCH RBC QN AUTO: 23.3 PG (ref 27–31)
MCH RBC QN AUTO: 25.1 PG (ref 27–31)
MCHC RBC AUTO-ENTMCNC: 30 G/DL (ref 32–36)
MCHC RBC AUTO-ENTMCNC: 30 G/DL (ref 32–36)
MCHC RBC AUTO-ENTMCNC: 30.6 G/DL (ref 32–36)
MCV RBC AUTO: 78 FL (ref 82–98)
MCV RBC AUTO: 78 FL (ref 82–98)
MCV RBC AUTO: 82 FL (ref 82–98)
MONOCYTES # BLD AUTO: 0.4 K/UL (ref 0.3–1)
MONOCYTES # BLD AUTO: 0.5 K/UL (ref 0.3–1)
MONOCYTES # BLD AUTO: 0.5 K/UL (ref 0.3–1)
MONOCYTES NFR BLD: 4.8 % (ref 4–15)
MONOCYTES NFR BLD: 5 % (ref 4–15)
MONOCYTES NFR BLD: 5 % (ref 4–15)
NEUTROPHILS # BLD AUTO: 6.1 K/UL (ref 1.8–7.7)
NEUTROPHILS # BLD AUTO: 7.2 K/UL (ref 1.8–7.7)
NEUTROPHILS # BLD AUTO: 7.2 K/UL (ref 1.8–7.7)
NEUTROPHILS NFR BLD: 66.4 % (ref 38–73)
NEUTROPHILS NFR BLD: 70.6 % (ref 38–73)
NEUTROPHILS NFR BLD: 70.6 % (ref 38–73)
NRBC BLD-RTO: 0 /100 WBC
PLATELET # BLD AUTO: 236 K/UL (ref 150–350)
PLATELET # BLD AUTO: 236 K/UL (ref 150–350)
PLATELET # BLD AUTO: 244 K/UL (ref 150–350)
PMV BLD AUTO: 10.8 FL (ref 9.2–12.9)
PMV BLD AUTO: 10.8 FL (ref 9.2–12.9)
PMV BLD AUTO: 10.9 FL (ref 9.2–12.9)
RBC # BLD AUTO: 3.22 M/UL (ref 4–5.4)
RBC # BLD AUTO: 3.22 M/UL (ref 4–5.4)
RBC # BLD AUTO: 3.47 M/UL (ref 4–5.4)
RPR SER QL: NORMAL
WBC # BLD AUTO: 10.17 K/UL (ref 3.9–12.7)
WBC # BLD AUTO: 10.17 K/UL (ref 3.9–12.7)
WBC # BLD AUTO: 9.24 K/UL (ref 3.9–12.7)

## 2019-10-30 PROCEDURE — 36415 COLL VENOUS BLD VENIPUNCTURE: CPT

## 2019-10-30 PROCEDURE — 25000003 PHARM REV CODE 250: Performed by: OBSTETRICS & GYNECOLOGY

## 2019-10-30 PROCEDURE — 99232 PR SUBSEQUENT HOSPITAL CARE,LEVL II: ICD-10-PCS | Mod: ,,, | Performed by: OBSTETRICS & GYNECOLOGY

## 2019-10-30 PROCEDURE — 25000003 PHARM REV CODE 250: Performed by: STUDENT IN AN ORGANIZED HEALTH CARE EDUCATION/TRAINING PROGRAM

## 2019-10-30 PROCEDURE — 11000001 HC ACUTE MED/SURG PRIVATE ROOM

## 2019-10-30 PROCEDURE — 99232 SBSQ HOSP IP/OBS MODERATE 35: CPT | Mod: ,,, | Performed by: OBSTETRICS & GYNECOLOGY

## 2019-10-30 PROCEDURE — 36430 TRANSFUSION BLD/BLD COMPNT: CPT

## 2019-10-30 PROCEDURE — 63600175 PHARM REV CODE 636 W HCPCS: Performed by: STUDENT IN AN ORGANIZED HEALTH CARE EDUCATION/TRAINING PROGRAM

## 2019-10-30 PROCEDURE — 85025 COMPLETE CBC W/AUTO DIFF WBC: CPT

## 2019-10-30 PROCEDURE — P9021 RED BLOOD CELLS UNIT: HCPCS

## 2019-10-30 RX ORDER — FERROUS SULFATE 325(65) MG
325 TABLET, DELAYED RELEASE (ENTERIC COATED) ORAL DAILY
Status: DISCONTINUED | OUTPATIENT
Start: 2019-10-30 | End: 2019-10-31 | Stop reason: HOSPADM

## 2019-10-30 RX ORDER — HYDROCODONE BITARTRATE AND ACETAMINOPHEN 500; 5 MG/1; MG/1
TABLET ORAL
Status: DISCONTINUED | OUTPATIENT
Start: 2019-10-30 | End: 2019-10-30

## 2019-10-30 RX ADMIN — CLONAZEPAM 1 MG: 0.5 TABLET ORAL at 05:10

## 2019-10-30 RX ADMIN — FLUOXETINE 20 MG: 20 CAPSULE ORAL at 08:10

## 2019-10-30 RX ADMIN — DOCUSATE SODIUM 200 MG: 100 CAPSULE, LIQUID FILLED ORAL at 08:10

## 2019-10-30 RX ADMIN — METHADONE HYDROCHLORIDE 50 MG: 10 TABLET ORAL at 09:10

## 2019-10-30 RX ADMIN — IBUPROFEN 600 MG: 600 TABLET, FILM COATED ORAL at 08:10

## 2019-10-30 RX ADMIN — FERROUS SULFATE TAB EC 325 MG (65 MG FE EQUIVALENT) 325 MG: 325 (65 FE) TABLET DELAYED RESPONSE at 08:10

## 2019-10-30 RX ADMIN — SODIUM CHLORIDE, SODIUM LACTATE, POTASSIUM CHLORIDE, AND CALCIUM CHLORIDE: 600; 310; 30; 20 INJECTION, SOLUTION INTRAVENOUS at 12:10

## 2019-10-30 RX ADMIN — SENNOSIDES,DOCUSATE SODIUM 1 TABLET: 8.6; 5 TABLET, FILM COATED ORAL at 08:10

## 2019-10-30 RX ADMIN — IBUPROFEN 600 MG: 600 TABLET, FILM COATED ORAL at 05:10

## 2019-10-30 RX ADMIN — CLONAZEPAM 1 MG: 0.5 TABLET ORAL at 09:10

## 2019-10-30 NOTE — L&D DELIVERY NOTE
[facility]  Vaginal Delivery   Operative Note    SUMMARY   Normal spontaneous vaginal delivery of live infant, was placed on mothers abdomen for skin to skin and bulb suctioning performed.  Infant delivered position OA over intact perineum.  Nuchal cord: No.     Spontaneous delivery of placenta and IV pitocin given noting good uterine tone.  No lacerations noted.  Patient tolerated delivery well. Sponge needle and lap counted correctly x2.    Indications: <principal problem not specified>  Pregnancy complicated by:   Patient Active Problem List   Diagnosis    Hepatitis C    Tobacco abuse    Pregnancy with poor reproductive history, antepartum    IV drug abuse complicating pregnancy    Methadone use    Grand multiparity    History of postpartum hemorrhage requiring transfusion    Graves disease    PTSD (post-traumatic stress disorder)    Mental disorder affecting pregnancy, antepartum    Viral hepatitis affecting pregnancy, antepartum    Hyperthyroidism affecting pregnancy, antepartum    Encounter for sterilization    Tobacco smoking affecting pregnancy, antepartum    Indication for care in labor and delivery, antepartum     (spontaneous vaginal delivery)    40 weeks gestation of pregnancy     Admitting GA: 40w1d    Delivery Information for Saul Fowler    Birth information:  YOB: 2017   Time of birth: 5:55 PM   Sex: male   Head Delivery Date/Time: 10/29/2019  5:55 PM   Delivery type: Vaginal, Spontaneous   Gestational Age: 40w1d    Delivery Providers    Delivering clinician:  Shakila Stone MD   Provider Role    ISHAAN Elizabeth RN Melissa A Barcia-Pique, RN James D. Toppin, MD             Measurements    Weight:  3402 g  Length:  52.7 cm  Head circumference:  34.9 cm  Chest circumference:  33 cm         Apgars    Living status:  Living  Apgars:   1 min.:   5 min.:   10 min.:   15 min.:   20 min.:     Skin color:   1  1        Heart rate:   2  2       Reflex irritability:   2  2       Muscle tone:   2  2       Respiratory effort:   2  2       Total:   9  9       Apgars assigned by:  PER NICU         Operative Delivery    Forceps attempted?:  No  Vacuum extractor attempted?:  No         Shoulder Dystocia    Shoulder dystocia present?:  No           Presentation    Presentation:  Vertex  Position:  Right Occiput Anterior           Interventions/Resuscitation    Method:  NICU Attended       Cord    Vessels:  3 vessels  Complications:  None  Delayed Cord Clamping?:  No  Cord Blood Disposition:  Sent with Baby  Gases Sent?:  No  Stem Cell Collection (by MD):  No       Placenta    Placenta delivery date/time:  10/29/2019 1800  Placenta removal:  Spontaneous  Placenta appearance:  Intact  Placenta disposition:  discarded           Labor Events:       labor: No     Labor Onset Date/Time:         Dilation Complete Date/Time: 10/29/2019 17:48     Start Pushing Date/Time: 10/29/2019 17:54       Start Pushing Date/Time: 10/29/2019 17:54     Rupture Date/Time:              Rupture type:           Fluid Amount:        Fluid Color:        Fluid Odor:        Membrane Status:                 steroids: None     Antibiotics given for GBS: Yes     Induction: oxytocin     Indications for induction:  Post-term Gestation     Augmentation:       Indications for augmentation:       Labor complications: None     Additional complications:          Cervical ripening:                     Delivery:      Episiotomy: None     Indication for Episiotomy:       Perineal Lacerations: None Repaired:      Periurethral Laceration:   Repaired:     Labial Laceration:   Repaired:     Sulcus Laceration:   Repaired:     Vaginal Laceration:   Repaired:     Cervical Laceration:   Repaired:     Repair suture: None     Repair # of packets: 0     Last Value - EBL - Nursing (mL):       Sum - EBL - Nursing (mL): 0     Last Value - EBL - Anesthesia (mL):      Calculated  QBL (mL): 142      Vaginal Sweep Performed: No     Surgicount Correct:         Other providers:       Anesthesia    Method:  Epidural       KAUSHAL Dang MD  OBGYN PGY1

## 2019-10-30 NOTE — PLAN OF CARE
DCFS worker, Genevieve Haley ([phone]), will likely be here to see pt. Nursing and MD staff can provide DCFS worker with medical information requested.     Ivett Worthy LCSW    [facility]  [email or web address]    (phone) [phone] or  [phone]. [phone]  (fax) [phone]

## 2019-10-30 NOTE — NURSING
Pt complaining of pain this morning. Asked pt if she was able to take Ibuprofen considering her allergy was Tordaol. She stated that she can take Ibuprofen. Notified Dr. Marshall, will adjust allergy in chart and contact pharmacy for verification of Ibuprofen.

## 2019-10-30 NOTE — ANESTHESIA POSTPROCEDURE EVALUATION
Anesthesia Post Evaluation    Patient: Jocelynn Fowler    Procedure(s) Performed: * No procedures listed *    Final Anesthesia Type: epidural  Patient location during evaluation: floor  Patient participation: Yes- Able to Participate  Level of consciousness: awake and alert  Post-procedure vital signs: reviewed and stable  Pain management: adequate  Airway patency: patent  PONV status at discharge: No PONV  Anesthetic complications: no      Cardiovascular status: blood pressure returned to baseline and hemodynamically stable  Respiratory status: unassisted, spontaneous ventilation and room air  Hydration status: euvolemic  Follow-up not needed.          Vitals Value Taken Time   /56 10/30/2019 11:30 AM   Temp 36.7 °C (98.1 °F) 10/30/2019 11:30 AM   Pulse 72 10/30/2019 11:30 AM   Resp 18 10/30/2019 11:30 AM   SpO2 98 % 10/30/2019 11:30 AM         No case tracking events are documented in the log.      Pain/Jacques Score: Pain Rating Prior to Med Admin: 8 (10/30/2019  9:03 AM)  Pain Rating Post Med Admin: 2 (10/29/2019 11:03 PM)

## 2019-10-30 NOTE — PLAN OF CARE
Attempted to speak to pt to obtain a substance use history and complete a d/c planning assessment for both pt and . Pt was extremely sedated and was unable to complete assessment. Pt reported she was extremely exhausted. Sw informed pt she would return later this afternoon.     Mom provided confirmation that she is being rx'd 100 mg of methadone daily by [facility] recovery. However, she was also +Opiates.    Since pt's utox resulted +opiates, methadone, and benzos and 's  utox resulted + opiates and methadone a report to [facility] was called into [facility] hotline at [phone]. Report taken by Maryann Iglesias # [phone]. Electronic report will be filed as well.     Will cont to f/u.    Ivett Worthy LCSW    [facility]  [email or web address]    (phone) [phone] or  Jyb. [phone]  (fax) [phone]

## 2019-10-30 NOTE — PROGRESS NOTES
POSTPARTUM PROGRESS NOTE     Jocelynn Fowler is a 34 y.o. female PPD #1 status post Spontaneous vaginal delivery at 40w1d in a pregnancy complicated by Hep C, IVD use, Anxiety/Depression, hypothyroid, anemia. Patient is doing well this morning. She denies nausea, vomiting, fever or chills.  Patient reports mild abdominal pain that is adequately relieved by oral pain medications. Lochia is mild to moderate  and stable. Patient is voiding without difficulty and ambulating with no difficulty. She has passed flatus.  Patient does plan to breast feed. Plan for BTL for contraception. She does desires circumcision and has been consented.     Objective:       Temp:  [97.3 °F (36.3 °C)-98.9 °F (37.2 °C)] 98.5 °F (36.9 °C)  Pulse:  [49-92] 64  Resp:  [16-20] 18  SpO2:  [81 %-100 %] 97 %  BP: ()/(42-68) 97/52    General:   alert, appears stated age and cooperative   Lungs:   Non-labored respirations    Heart:   regular rate and rhythm   Abdomen:  Soft, nondistended    Uterus:  firm located at the umblicus.    Extremities: no pedal edema noted     Lab Review  Recent Results (from the past 4 hour(s))   CBC auto differential    Collection Time: 10/30/19  5:49 AM   Result Value Ref Range    WBC 10.17 3.90 - 12.70 K/uL    RBC 3.22 (L) 4.00 - 5.40 M/uL    Hemoglobin 7.5 (L) 12.0 - 16.0 g/dL    Hematocrit 25.0 (L) 37.0 - 48.5 %    Mean Corpuscular Volume 78 (L) 82 - 98 fL    Mean Corpuscular Hemoglobin 23.3 (L) 27.0 - 31.0 pg    Mean Corpuscular Hemoglobin Conc 30.0 (L) 32.0 - 36.0 g/dL    RDW 15.9 (H) 11.5 - 14.5 %    Platelets 236 150 - 350 K/uL    MPV 10.8 9.2 - 12.9 fL    Immature Granulocytes 0.4 0.0 - 0.5 %    Gran # (ANC) 7.2 1.8 - 7.7 K/uL    Immature Grans (Abs) 0.04 0.00 - 0.04 K/uL    Lymph # 2.4 1.0 - 4.8 K/uL    Mono # 0.5 0.3 - 1.0 K/uL    Eos # 0.0 0.0 - 0.5 K/uL    Baso # 0.02 0.00 - 0.20 K/uL    nRBC 0 0 /100 WBC    Gran% 70.6 38.0 - 73.0 %    Lymph% 23.6 18.0 - 48.0 %    Mono% 5.0 4.0 - 15.0 %    Eosinophil% 0.2  0.0 - 8.0 %    Basophil% 0.2 0.0 - 1.9 %    Differential Method Automated    CBC auto differential    Collection Time: 10/30/19  5:49 AM   Result Value Ref Range    WBC 10.17 3.90 - 12.70 K/uL    RBC 3.22 (L) 4.00 - 5.40 M/uL    Hemoglobin 7.5 (L) 12.0 - 16.0 g/dL    Hematocrit 25.0 (L) 37.0 - 48.5 %    Mean Corpuscular Volume 78 (L) 82 - 98 fL    Mean Corpuscular Hemoglobin 23.3 (L) 27.0 - 31.0 pg    Mean Corpuscular Hemoglobin Conc 30.0 (L) 32.0 - 36.0 g/dL    RDW 15.9 (H) 11.5 - 14.5 %    Platelets 236 150 - 350 K/uL    MPV 10.8 9.2 - 12.9 fL    Immature Granulocytes 0.4 0.0 - 0.5 %    Gran # (ANC) 7.2 1.8 - 7.7 K/uL    Immature Grans (Abs) 0.04 0.00 - 0.04 K/uL    Lymph # 2.4 1.0 - 4.8 K/uL    Mono # 0.5 0.3 - 1.0 K/uL    Eos # 0.0 0.0 - 0.5 K/uL    Baso # 0.02 0.00 - 0.20 K/uL    nRBC 0 0 /100 WBC    Gran% 70.6 38.0 - 73.0 %    Lymph% 23.6 18.0 - 48.0 %    Mono% 5.0 4.0 - 15.0 %    Eosinophil% 0.2 0.0 - 8.0 %    Basophil% 0.2 0.0 - 1.9 %    Differential Method Automated        I/O    Intake/Output Summary (Last 24 hours) at 10/30/2019 0635  Last data filed at 10/30/2019 0520  Gross per 24 hour   Intake 1340 ml   Output 1092 ml   Net 248 ml        Assessment:     Patient Active Problem List   Diagnosis    Hepatitis C    Tobacco abuse    Pregnancy with poor reproductive history, antepartum    IV drug abuse complicating pregnancy    Methadone use    Grand multiparity    History of postpartum hemorrhage requiring transfusion    Graves disease    PTSD (post-traumatic stress disorder)    Mental disorder affecting pregnancy, antepartum    Viral hepatitis affecting pregnancy, antepartum    Hyperthyroidism affecting pregnancy, antepartum    Encounter for sterilization    Tobacco smoking affecting pregnancy, antepartum    Indication for care in labor and delivery, antepartum     (spontaneous vaginal delivery)    40 weeks gestation of pregnancy        Plan:   1. Postpartum care:  - Patient doing well.  Continue routine management and advances.  - Continue PO pain meds. Pain well controlled.  - Heme: H/h 7.8/25 >>> 7.5/25  - Encourage ambulation  - Circumcision desired and consented  - Contraception: patient currently NPO > plan for tubal this AM  - Lactation consult PRN    2. Hep C  - No meds currently   - Viral load pending     3. IVD use  - Social work consult placed  - Methadone    4. Anemia  - Asx  - Fe/Colace    5. Anxiety  - Atarax PRN   - Ativan x 1     6. Depression  - Daily prozac      Dispo: As patient meets milestones, will plan to discharge PPD#2.    Lorraine Sullivan M.D.  RYAN PGY1

## 2019-10-31 ENCOUNTER — ANESTHESIA EVENT (OUTPATIENT)
Dept: OBSTETRICS AND GYNECOLOGY | Facility: OTHER | Age: 34
End: 2019-10-31
Payer: MEDICAID

## 2019-10-31 ENCOUNTER — ANESTHESIA (OUTPATIENT)
Dept: OBSTETRICS AND GYNECOLOGY | Facility: OTHER | Age: 34
End: 2019-10-31
Payer: MEDICAID

## 2019-10-31 VITALS
HEART RATE: 53 BPM | DIASTOLIC BLOOD PRESSURE: 51 MMHG | OXYGEN SATURATION: 98 % | SYSTOLIC BLOOD PRESSURE: 105 MMHG | RESPIRATION RATE: 18 BRPM | TEMPERATURE: 98 F

## 2019-10-31 PROBLEM — Z98.51 STATUS POST TUBAL LIGATION: Status: ACTIVE | Noted: 2019-10-31

## 2019-10-31 LAB — BACTERIA SPEC AEROBE CULT: NORMAL

## 2019-10-31 PROCEDURE — 25000003 PHARM REV CODE 250

## 2019-10-31 PROCEDURE — 71000039 HC RECOVERY, EACH ADD'L HOUR: Performed by: OBSTETRICS & GYNECOLOGY

## 2019-10-31 PROCEDURE — 36004723: Performed by: OBSTETRICS & GYNECOLOGY

## 2019-10-31 PROCEDURE — D9220A PRA ANESTHESIA: Mod: ,,, | Performed by: ANESTHESIOLOGY

## 2019-10-31 PROCEDURE — 63600175 PHARM REV CODE 636 W HCPCS: Performed by: STUDENT IN AN ORGANIZED HEALTH CARE EDUCATION/TRAINING PROGRAM

## 2019-10-31 PROCEDURE — 25000003 PHARM REV CODE 250: Performed by: STUDENT IN AN ORGANIZED HEALTH CARE EDUCATION/TRAINING PROGRAM

## 2019-10-31 PROCEDURE — 58605 DIVISION OF FALLOPIAN TUBE: CPT | Mod: ,,, | Performed by: OBSTETRICS & GYNECOLOGY

## 2019-10-31 PROCEDURE — 25000003 PHARM REV CODE 250: Performed by: OBSTETRICS & GYNECOLOGY

## 2019-10-31 PROCEDURE — D9220A PRA ANESTHESIA: ICD-10-PCS | Mod: ,,, | Performed by: ANESTHESIOLOGY

## 2019-10-31 PROCEDURE — 37000009 HC ANESTHESIA EA ADD 15 MINS: Performed by: OBSTETRICS & GYNECOLOGY

## 2019-10-31 PROCEDURE — 58605 PR LIGATE FALLOPIAN TUBE,POSTPARTUM: ICD-10-PCS | Mod: ,,, | Performed by: OBSTETRICS & GYNECOLOGY

## 2019-10-31 PROCEDURE — 36004722: Performed by: OBSTETRICS & GYNECOLOGY

## 2019-10-31 PROCEDURE — 99238 PR HOSPITAL DISCHARGE DAY,<30 MIN: ICD-10-PCS | Mod: ,,, | Performed by: OBSTETRICS & GYNECOLOGY

## 2019-10-31 PROCEDURE — 37000008 HC ANESTHESIA 1ST 15 MINUTES: Performed by: OBSTETRICS & GYNECOLOGY

## 2019-10-31 PROCEDURE — 27200688 HC TRAY, SPINAL-HYPER/ ISOBARIC: Performed by: STUDENT IN AN ORGANIZED HEALTH CARE EDUCATION/TRAINING PROGRAM

## 2019-10-31 PROCEDURE — 88302 TISSUE EXAM BY PATHOLOGIST: CPT | Mod: 26,,, | Performed by: PATHOLOGY

## 2019-10-31 PROCEDURE — 71000033 HC RECOVERY, INTIAL HOUR: Performed by: OBSTETRICS & GYNECOLOGY

## 2019-10-31 PROCEDURE — 99238 HOSP IP/OBS DSCHRG MGMT 30/<: CPT | Mod: ,,, | Performed by: OBSTETRICS & GYNECOLOGY

## 2019-10-31 PROCEDURE — 88302 TISSUE EXAM BY PATHOLOGIST: CPT | Performed by: PATHOLOGY

## 2019-10-31 PROCEDURE — 88302 TISSUE SPECIMEN TO PATHOLOGY - SURGERY: ICD-10-PCS | Mod: 26,,, | Performed by: PATHOLOGY

## 2019-10-31 RX ORDER — ONDANSETRON HYDROCHLORIDE 2 MG/ML
INJECTION, SOLUTION INTRAMUSCULAR; INTRAVENOUS
Status: DISCONTINUED | OUTPATIENT
Start: 2019-10-31 | End: 2019-10-31

## 2019-10-31 RX ORDER — ONDANSETRON 2 MG/ML
4 INJECTION INTRAMUSCULAR; INTRAVENOUS EVERY 6 HOURS PRN
Status: CANCELLED | OUTPATIENT
Start: 2019-10-31 | End: 2019-11-01

## 2019-10-31 RX ORDER — BUPIVACAINE HYDROCHLORIDE 7.5 MG/ML
INJECTION, SOLUTION INTRASPINAL
Status: DISCONTINUED | OUTPATIENT
Start: 2019-10-31 | End: 2019-10-31

## 2019-10-31 RX ORDER — CLONAZEPAM 0.5 MG/1
1 TABLET ORAL EVERY 6 HOURS PRN
Status: DISCONTINUED | OUTPATIENT
Start: 2019-10-31 | End: 2019-10-31 | Stop reason: HOSPADM

## 2019-10-31 RX ORDER — OXYCODONE HYDROCHLORIDE 5 MG/1
5 TABLET ORAL EVERY 4 HOURS PRN
Status: CANCELLED | OUTPATIENT
Start: 2019-10-31 | End: 2019-11-01

## 2019-10-31 RX ORDER — SODIUM CITRATE AND CITRIC ACID MONOHYDRATE 334; 500 MG/5ML; MG/5ML
30 SOLUTION ORAL
Status: COMPLETED | OUTPATIENT
Start: 2019-10-31 | End: 2019-10-31

## 2019-10-31 RX ORDER — GLYCOPYRROLATE 0.2 MG/ML
INJECTION INTRAMUSCULAR; INTRAVENOUS
Status: DISCONTINUED | OUTPATIENT
Start: 2019-10-31 | End: 2019-10-31

## 2019-10-31 RX ORDER — OXYCODONE HYDROCHLORIDE 5 MG/1
10 TABLET ORAL EVERY 4 HOURS PRN
Status: CANCELLED | OUTPATIENT
Start: 2019-10-31 | End: 2019-11-01

## 2019-10-31 RX ORDER — DEXAMETHASONE SODIUM PHOSPHATE 4 MG/ML
INJECTION, SOLUTION INTRA-ARTICULAR; INTRALESIONAL; INTRAMUSCULAR; INTRAVENOUS; SOFT TISSUE
Status: DISCONTINUED | OUTPATIENT
Start: 2019-10-31 | End: 2019-10-31

## 2019-10-31 RX ORDER — OXYCODONE AND ACETAMINOPHEN 5; 325 MG/1; MG/1
1 TABLET ORAL EVERY 4 HOURS PRN
Status: DISCONTINUED | OUTPATIENT
Start: 2019-10-31 | End: 2019-10-31 | Stop reason: HOSPADM

## 2019-10-31 RX ORDER — ACETAMINOPHEN 10 MG/ML
INJECTION, SOLUTION INTRAVENOUS
Status: DISCONTINUED | OUTPATIENT
Start: 2019-10-31 | End: 2019-10-31

## 2019-10-31 RX ORDER — IBUPROFEN 600 MG/1
600 TABLET ORAL 3 TIMES DAILY
Qty: 60 TABLET | Refills: 1 | Status: SHIPPED | OUTPATIENT
Start: 2019-10-31

## 2019-10-31 RX ORDER — FENTANYL CITRATE 50 UG/ML
INJECTION, SOLUTION INTRAMUSCULAR; INTRAVENOUS
Status: DISCONTINUED | OUTPATIENT
Start: 2019-10-31 | End: 2019-10-31

## 2019-10-31 RX ORDER — OXYCODONE AND ACETAMINOPHEN 10; 325 MG/1; MG/1
1 TABLET ORAL EVERY 4 HOURS PRN
Status: DISCONTINUED | OUTPATIENT
Start: 2019-10-31 | End: 2019-10-31 | Stop reason: HOSPADM

## 2019-10-31 RX ORDER — CEFAZOLIN SODIUM 2 G/50ML
SOLUTION INTRAVENOUS
Status: DISPENSED
Start: 2019-10-31 | End: 2019-10-31

## 2019-10-31 RX ORDER — SODIUM CITRATE AND CITRIC ACID MONOHYDRATE 334; 500 MG/5ML; MG/5ML
SOLUTION ORAL
Status: COMPLETED
Start: 2019-10-31 | End: 2019-10-31

## 2019-10-31 RX ORDER — EPHEDRINE SULFATE 50 MG/ML
INJECTION, SOLUTION INTRAVENOUS
Status: DISCONTINUED | OUTPATIENT
Start: 2019-10-31 | End: 2019-10-31

## 2019-10-31 RX ADMIN — ONDANSETRON 4 MG: 2 INJECTION, SOLUTION INTRAMUSCULAR; INTRAVENOUS at 09:10

## 2019-10-31 RX ADMIN — SODIUM CITRATE AND CITRIC ACID MONOHYDRATE 30 ML: 500; 334 SOLUTION ORAL at 09:10

## 2019-10-31 RX ADMIN — Medication 10 MG: at 10:10

## 2019-10-31 RX ADMIN — FENTANYL CITRATE 10 MCG: 50 INJECTION, SOLUTION INTRAMUSCULAR; INTRAVENOUS at 10:10

## 2019-10-31 RX ADMIN — METHADONE HYDROCHLORIDE 50 MG: 10 TABLET ORAL at 09:10

## 2019-10-31 RX ADMIN — FERROUS SULFATE TAB EC 325 MG (65 MG FE EQUIVALENT) 325 MG: 325 (65 FE) TABLET DELAYED RESPONSE at 09:10

## 2019-10-31 RX ADMIN — CLONAZEPAM 1 MG: 0.5 TABLET ORAL at 03:10

## 2019-10-31 RX ADMIN — PHENYLEPHRINE HYDROCHLORIDE 50 MCG/MIN: 10 INJECTION INTRAVENOUS at 10:10

## 2019-10-31 RX ADMIN — Medication 30 MG: at 10:10

## 2019-10-31 RX ADMIN — IBUPROFEN 600 MG: 600 TABLET, FILM COATED ORAL at 09:10

## 2019-10-31 RX ADMIN — GLYCOPYRROLATE 0.2 MG: 0.2 INJECTION INTRAMUSCULAR; INTRAVENOUS at 10:10

## 2019-10-31 RX ADMIN — EPHEDRINE SULFATE 10 MG: 50 INJECTION INTRAMUSCULAR; INTRAVENOUS; SUBCUTANEOUS at 10:10

## 2019-10-31 RX ADMIN — SODIUM CITRATE AND CITRIC ACID MONOHYDRATE 30 ML: 334; 500 SOLUTION ORAL at 09:10

## 2019-10-31 RX ADMIN — CLONAZEPAM 1 MG: 0.5 TABLET ORAL at 12:10

## 2019-10-31 RX ADMIN — SENNOSIDES,DOCUSATE SODIUM 1 TABLET: 8.6; 5 TABLET, FILM COATED ORAL at 08:10

## 2019-10-31 RX ADMIN — ACETAMINOPHEN 1000 MG: 10 INJECTION, SOLUTION INTRAVENOUS at 10:10

## 2019-10-31 RX ADMIN — Medication 20 MG: at 10:10

## 2019-10-31 RX ADMIN — DEXTROSE 2 G: 50 INJECTION, SOLUTION INTRAVENOUS at 09:10

## 2019-10-31 RX ADMIN — IBUPROFEN 600 MG: 600 TABLET, FILM COATED ORAL at 03:10

## 2019-10-31 RX ADMIN — CLONAZEPAM 1 MG: 0.5 TABLET ORAL at 09:10

## 2019-10-31 RX ADMIN — DOCUSATE SODIUM 200 MG: 100 CAPSULE, LIQUID FILLED ORAL at 08:10

## 2019-10-31 RX ADMIN — FENTANYL CITRATE 25 MCG: 50 INJECTION, SOLUTION INTRAMUSCULAR; INTRAVENOUS at 10:10

## 2019-10-31 RX ADMIN — CLONAZEPAM 1 MG: 0.5 TABLET ORAL at 08:10

## 2019-10-31 RX ADMIN — BUPIVACAINE HYDROCHLORIDE IN DEXTROSE 1.8 ML: 7.5 INJECTION, SOLUTION SUBARACHNOID at 10:10

## 2019-10-31 RX ADMIN — OXYCODONE HYDROCHLORIDE AND ACETAMINOPHEN 1 TABLET: 10; 325 TABLET ORAL at 04:10

## 2019-10-31 RX ADMIN — FLUOXETINE 20 MG: 20 CAPSULE ORAL at 09:10

## 2019-10-31 RX ADMIN — DEXAMETHASONE SODIUM PHOSPHATE 4 MG: 4 INJECTION, SOLUTION INTRAMUSCULAR; INTRAVENOUS at 09:10

## 2019-10-31 RX ADMIN — SODIUM CHLORIDE, SODIUM LACTATE, POTASSIUM CHLORIDE, AND CALCIUM CHLORIDE: 600; 310; 30; 20 INJECTION, SOLUTION INTRAVENOUS at 09:10

## 2019-10-31 RX ADMIN — IBUPROFEN 600 MG: 600 TABLET, FILM COATED ORAL at 12:10

## 2019-10-31 RX ADMIN — OXYCODONE HYDROCHLORIDE AND ACETAMINOPHEN 1 TABLET: 10; 325 TABLET ORAL at 08:10

## 2019-10-31 NOTE — DISCHARGE SUMMARY
Delivery Discharge Summary  Obstetrics      Primary OB Clinician: Dr. Nadia Ngo    Admission date: 10/29/2019  Discharge date: 2019    Disposition: To home, self care    Discharge Diagnosis List:      Patient Active Problem List   Diagnosis    Hepatitis C    Tobacco abuse    Pregnancy with poor reproductive history, antepartum    IV drug abuse complicating pregnancy    Methadone use    Grand multiparity    History of postpartum hemorrhage requiring transfusion    Graves disease    PTSD (post-traumatic stress disorder)    Mental disorder affecting pregnancy, antepartum    Viral hepatitis affecting pregnancy, antepartum    Hyperthyroidism affecting pregnancy, antepartum    Encounter for sterilization    Tobacco smoking affecting pregnancy, antepartum    Indication for care in labor and delivery, antepartum     (spontaneous vaginal delivery)    40 weeks gestation of pregnancy    Status post tubal ligation       Procedure: ; ppBTL    Hospital Course:  Jocelynn Fowler is a 34 y.o. now , PPD #2 who was admitted on 10/29/2019 at 40w1d for normal labor. Patient was subsequently admitted to labor and delivery unit with signed consents.     Please see delivery note for further details. Her postpartum course was uncomplicated and she received a ppBTL on PPD 2. On discharge day, patient's pain is controlled with oral pain medications. Pt is tolerating ambulation without SOB or CP, and regular diet without N/V. Reports lochia is mild. Denies any HA, vision changes, F/C, LE swelling. Denies any breast pain/soreness.    Pt in stable condition and ready for discharge. She has been instructed to start and/or continue medications and follow up with her obstetrics provider as listed below.    Pertinent studies:  CBC  Recent Labs   Lab 10/29/19  1048 10/30/19  0549 10/30/19  2133   WBC 8.42 10.17  10.17 9.24   HGB 7.8* 7.5*  7.5* 8.7*   HCT 25.2* 25.0*  25.0* 28.4*   MCV 75* 78*  78* 82     236  236 244          Immunization History   Administered Date(s) Administered    Tdap 08/21/2019        Delivery:    Episiotomy: None   Lacerations: None   Repair suture: None   Repair # of packets: 0   Blood loss (ml):       Birth information:  YOB: 2017   Time of birth: 5:55 PM   Sex: male   Delivery type: Vaginal, Spontaneous   Gestational Age: 40w1d    Delivery Clinician:      Other providers:       Additional  information:  Forceps:    Vacuum:    Breech:    Observed anomalies      Living?:           APGARS  One minute Five minutes Ten minutes   Skin color:         Heart rate:         Grimace:         Muscle tone:         Breathing:         Totals: 9  9        Placenta: Delivered:       appearance      Patient Instructions:   Discharge Medication List as of 10/31/2019  6:37 PM      START taking these medications    Details   ibuprofen (ADVIL,MOTRIN) 600 MG tablet Take 1 tablet (600 mg total) by mouth 3 (three) times daily., Starting Thu 10/31/2019, Normal         CONTINUE these medications which have NOT CHANGED    Details   desvenlafaxine succinate (PRISTIQ) 50 MG Tb24 Take 50 mg by mouth once daily., Starting Tue 8/21/2018, Historical Med      FLUoxetine (PROZAC) 20 MG capsule Take 20 mg by mouth once daily., Historical Med      hydrOXYzine (ATARAX) 50 MG tablet Take 0.5 tablets (25 mg total) by mouth 2 (two) times daily as needed for Anxiety., Starting Wed 8/7/2019, Normal      loratadine (CLARITIN) 10 mg tablet Take 1 tablet (10 mg total) by mouth once daily., Starting Thu 5/30/2019, Until Fri 5/29/2020, Print      methadone (METHADOSE) 40 mg disintegrating tablet Take 90 mg by mouth once daily. , Historical Med      ondansetron (ZOFRAN-ODT) 4 MG TbDL Take 1 tablet (4 mg total) by mouth every 6 (six) hours as needed., Starting Wed 6/12/2019, Print      permethrin (NIX) 1 % liquid Apply to affected area once.  Leave on 8-14 hours.  Repeat in 2 weeks, Normal      phenol (THROAT SPRAY) 1.4 %  SprA by Mucous Membrane route every 2 (two) hours., Starting Sun 11/4/2018, Print      prenatal 21-iron fu-folic acid (PRENATAL COMPLETE) 14 mg iron- 400 mcg Tab Take 1 tablet by mouth once daily., Starting Thu 5/30/2019, Until Fri 5/29/2020, Print      propranolol (INDERAL) 10 MG tablet TAKE ONE TABLET BY MOUTH AS NEEDED UP TO ONE DAILY, Historical Med             Discharge Procedure Orders   Diet Adult Regular     Activity as tolerated       Follow-up Information     Nadia Ngo MD In 6 weeks.    Specialties:  Obstetrics, Obstetrics and Gynecology  Why:  Postpartum Follow-Up  Contact information:  [address] [id number] [phone]                  KAUSHAL Dang MD  OBGYN PGY1

## 2019-10-31 NOTE — PROGRESS NOTES
POSTPARTUM PROGRESS NOTE     Jocelynn Fowler is a 34 y.o. female PPD #2 status post Spontaneous vaginal delivery at 40w1d in a pregnancy complicated by Hep C, IVD use, Anxiety/Depression, hypothyroid, anemia. Patient is doing well this morning. She denies nausea, vomiting, fever or chills. Patient reports mild abdominal pain that is adequately relieved by oral pain medications. Lochia is mild to moderate  and stable. Patient is voiding without difficulty and ambulating with no difficulty. She has passed flatus.  Patient does plan to breast feed. Patient has been NPO since midnight with plan for BTL for contraception today. She does desires circumcision and has been consented.     Objective:       Temp:  [97.3 °F (36.3 °C)-98.3 °F (36.8 °C)] 98.3 °F (36.8 °C)  Pulse:  [57-72] 60  Resp:  [15-18] 15  SpO2:  [95 %-98 %] 97 %  BP: ()/(51-60) 102/60    General:   alert, appears stated age and cooperative   Lungs:   Non-labored respirations    Heart:   regular rate and rhythm   Abdomen:  Soft, nondistended    Uterus:  firm located at the umblicus.    Extremities: no pedal edema noted     Lab Review  No results found for this or any previous visit (from the past 4 hour(s)).    I/O    Intake/Output Summary (Last 24 hours) at 10/31/2019 0646  Last data filed at 10/30/2019 1445  Gross per 24 hour   Intake 281.25 ml   Output --   Net 281.25 ml        Assessment:     Patient Active Problem List   Diagnosis    Hepatitis C    Tobacco abuse    Pregnancy with poor reproductive history, antepartum    IV drug abuse complicating pregnancy    Methadone use    Grand multiparity    History of postpartum hemorrhage requiring transfusion    Graves disease    PTSD (post-traumatic stress disorder)    Mental disorder affecting pregnancy, antepartum    Viral hepatitis affecting pregnancy, antepartum    Hyperthyroidism affecting pregnancy, antepartum    Encounter for sterilization    Tobacco smoking affecting pregnancy,  antepartum    Indication for care in labor and delivery, antepartum     (spontaneous vaginal delivery)    40 weeks gestation of pregnancy        Plan:   1. Postpartum care:  - Patient doing well. Continue routine management and advances.  - Continue PO pain meds. Pain well controlled.  - Heme: H/h 7.8/25 >>> 7.5/25 >> s/p 1u pRBC for symptomatic reasons  - Encourage ambulation  - Circumcision desired and consented  - Contraception: patient currently NPO > plan for tubal this AM  - Lactation consult PRN    2. Hep C  - No meds currently   - Viral load pending     3. IVD use  - Social work consult placed  - Methadone    4. Anemia  - Asx  - Fe/Colace    5. Anxiety  - Atarax PRN   - Ativan x 1     6. Depression  - Daily prozac      Dispo: As patient meets milestones, will plan to discharge POD#1 from BT    Lorraine Sullivan M.D.  RYAN PGY1

## 2019-10-31 NOTE — ANESTHESIA PROCEDURE NOTES
Spinal    Diagnosis: desires infertility  Patient location during procedure: OR  Start time: 10/31/2019 9:51 AM  Timeout: 10/31/2019 9:50 AM  End time: 10/31/2019 10:04 AM    Staffing  Authorizing Provider: Carl Mchugh MD  Performing Provider: Kylah Baptiste MD    Spinal Block  Patient position: sitting  Prep: ChloraPrep  Patient monitoring: heart rate, continuous pulse ox and frequent blood pressure checks  Approach: midline  Location: L3-4  Injection technique: single shot  CSF Fluid: clear free-flowing CSF  Needle  Needle type: pencil-tip   Needle gauge: 25 G  Needle length: 3.5 in  Additional Documentation: incremental injection, negative aspiration for heme and no paresthesia on injection  Needle localization: anatomical landmarks  Assessment  Sensory level: T4   Dermatomal levels determined by pinch or prick  Ease of block: moderate  Patient's tolerance of the procedure: comfortable throughout block  Additional Notes  1.8ml of bupivacaine 0.75% with dextrose and 10mcg fentanyl

## 2019-10-31 NOTE — ANESTHESIA POSTPROCEDURE EVALUATION
Anesthesia Post Evaluation    Patient: Jocelynn Fowler    Procedure(s) Performed: Procedure(s) (LRB):  LIGATION, FALLOPIAN TUBE, POSTPARTUM (Bilateral)    Final Anesthesia Type: spinal  Patient location during evaluation: labor & delivery  Patient participation: Yes- Able to Participate  Level of consciousness: awake and alert  Post-procedure vital signs: reviewed and stable  Pain management: adequate  Airway patency: patent  PONV status at discharge: No PONV  Anesthetic complications: no      Cardiovascular status: blood pressure returned to baseline and hemodynamically stable  Respiratory status: unassisted, spontaneous ventilation and room air  Hydration status: euvolemic  Follow-up not needed.          Vitals Value Taken Time   /64 10/31/2019 12:32 PM   Temp 36.6 °C (97.8 °F) 10/31/2019 11:16 AM   Pulse 71 10/31/2019 12:47 PM   Resp 17 10/31/2019 11:32 AM   SpO2 95 % 10/31/2019 12:47 PM   Vitals shown include unvalidated device data.      No case tracking events are documented in the log.      Pain/Jacques Score: Pain Rating Prior to Med Admin: 5 (10/31/2019  9:22 AM)  Pain Rating Post Med Admin: 0 (10/31/2019  1:30 AM)

## 2019-10-31 NOTE — NURSING
Patient is back on MBU from recovery.  Patient diet is now changed to regular diet.  Patient is doing well at this time.

## 2019-10-31 NOTE — PLAN OF CARE
Copied from baby's NICU chart:    SOCIAL WORK DISCHARGE PLANNING ASSESSMENT    Sw completed discharge planning assessment with pt's parents in mother's room 609.  Pt's parents were guarded. Toward the end of assessment, FOFRANKLIN got up and left. He appeared to be upset about mom's relapse on opiates even though he knew about this information. Education on the role of  was provided. Emotional support provided throughout assessment.    Mom reported past h/o IV heroin use but has been mostly clean since 2 months prior to finding out she was pregnant. Mom reported she did relapse on heroin in September around her birthday. Mom also reported taking tramadol at some point recently for back pain. Mom reported she believes opiates stay in her system longer because she has Graves disease and her kidneys metabolize opiates very slowly. One of mom's nurses told her this in the recent past per mom. SW is unsure if this information is accurate. However, mom utox has also been + opiates since  with [facility] where she gets her Methadone.     Confirmed with pt counselor, Essie, that pt was an active participant in methadone therapy. Essie confirmed mom has had +opiate drug screens since  but her methadone was not suspended because she is pregnant and they follow the harm reduction model.     Mom was also on Klonopin rx'd by her Psych md, Dr. Radhames Cardona, which is why she was +benzos at delivery. Mom plans to continue seeing her psych md for Klonopin and Prozac.     Legal Name: Nestor Ruiz   :  10/29/2019    Patient Active Problem List   Diagnosis    Single liveborn infant    Maternal drug abuse    Exposure to hepatitis C    Limited prenatal care, antepartum    In utero tobacco exposure         [facility]irth [facility]   MELO: 10/28/2017    Birth Weight: 3.402 kg (7 lb 8 oz)  Birth Length: 52.7 cm  Gestational Age: 40w1d          Apgars    Living status:  Living  Apgars:   1 min.:   5 min.:   10  min.:   15 min.:   20 min.:     Skin color:   1  1       Heart rate:   2  2       Reflex irritability:   2  2       Muscle tone:   2  2       Respiratory effort:   2  2       Total:   9  9       Apgars assigned by:  PER NICU         Mother: Jocelynn Fowler,  1985, 31 y/o  Address: [facility] [address] [place] [address]  Phone: [phone]  Employer: unemployed    Job Title: n/a  Education: 8th grade and dyslexic       Father: Abdulaziz Ruiz,  1973, 47 y/o  Address: unknown (was living with mom at his cousin's house but was kicked out and now living at mom's cousin's home)  Phone: [phone]  Employer: self employed  Job Title: construction/remodeling  Education:  some college  Signed Birth Certificate: Yes; parents have been in a relationship for approx 14 mos.    Support person(s): Vahe House (relative) [phone] and Lisa Perkins (relative) [phone]    Sibling(s): paternal siblings-none  Maternal siblings-Michele Busch (15 y/o), Latanya (13 y/o), Anthony (13 y/o), Remedios (3 y/o) and Hilario (2 y/o)--mom reported she still has custody of all children except Remedios who was adopted by her aunt but none of her children actually live with her. Remedios and Hilario were also born drug exposed. Sw is unsure if this reported information is accurate.    Spiritual Affiliation: None    Commercial Insurance Coverage: No    [place] Primary: Yes Secondary: No   Healthy [place]      Pediatrician: Dr. Merlos at [facility]      Nutrition: Formula    Breast Pump:   N/A    WIC:   Mom not certified; however will apply for        Essential Items: (includes car seat, crib/bassinet/pack-n-play, clothing, bottles, diapers, etc.)  Plans to acquire by discharge     Transportation: Personal vehicle, Family/friends and Medicaid transportation     Education: Information given on CPR classes; Physician/NNP daily rounds; and Postpartum Depression signs.     Resources Given: WIC clinic  list    Potential Eligibility for SSI Benefits: no    Potential Discharge Needs:  Report to DCFS due to drug exposure and Early Steps     Ivett Worthy LCSW    [facility] [place]  [email or web address]    (phone) [phone] or  Vfe. [phone]  (fax) [phone]

## 2019-10-31 NOTE — NURSING
S/W md regarding patient having a 10/10 pain after her BTL.  Advised md that patient only has Ibuprofen scheduled, however she is taking Methadone BID.  Order given for percocet prn.

## 2019-10-31 NOTE — PLAN OF CARE
Spoke to pt counselor from [facility] recovery, Essie [facility]. Essie confirmed that if pt does not present to Methadone clinic tomorrow that she will not be able to be dosed until Monday. Pt aware and plans to d/c today so she can resume tx at methadone clinic tomorrow.     [facility] is also requesting copy of pt MAR with all doses of Methadone listed. Pt nurse aware and will print for pt at time of d/c.    Ivett Worthy LCSW    [facility] [place]  [email or web address]    (phone) [phone] or  Ext. [phone]  (fax) [phone]

## 2019-10-31 NOTE — TRANSFER OF CARE
Anesthesia Transfer of Care Note    Patient: Jocelynn Fowler    Procedure(s) Performed: Procedure(s) (LRB):  LIGATION, FALLOPIAN TUBE, POSTPARTUM (Bilateral)    Patient location: Labor and Delivery    Anesthesia Type: spinal    Transport from OR: Transported from OR on room air with adequate spontaneous ventilation    Post pain: adequate analgesia    Post assessment: no apparent anesthetic complications    Post vital signs: stable    Level of consciousness: awake, alert and oriented    Nausea/Vomiting: no nausea/vomiting    Complications: none    Transfer of care protocol was followed      Last vitals:   Visit Vitals  BP (!) 94/52 (BP Location: Right arm, Patient Position: Lying)   Pulse (!) 51   Temp 36.6 °C (97.8 °F) (Oral)   Resp 18   LMP 02/12/2019   SpO2 95%   Breastfeeding? Unknown

## 2019-10-31 NOTE — ANESTHESIA PREPROCEDURE EVALUATION
Jocelynn Fowler is a 34 y.o. female,  with hx of IVDU, graves, hep c, and daily methadone who presents for BTL.    OB History    Para Term  AB Living   7 6 6 0 1 6   SAB TAB Ectopic Multiple Live Births   1     0 6      # Outcome Date GA Lbr Jacob/2nd Weight Sex Delivery Anes PTL Lv   7 Term 10/29/19 40w1d / 00:07 3.402 kg (7 lb 8 oz) M Vag-Spont EPI N YADI   6 Term    3.969 kg (8 lb 12 oz) M Vag-Spont   YADI   5 Term    4.167 kg (9 lb 3 oz) F Vag-Spont   YADI   4 Term    3.515 kg (7 lb 12 oz) M Vag-Spont   YADI      Complications: Postpartum hemorrhage   3 SAB            2 Term    4.167 kg (9 lb 3 oz) F Vag-Spont   YADI      Complications: Shoulder Dystocia, Postpartum hemorrhage   1 Term    3.969 kg (8 lb 12 oz) M Vag-Spont   YADI       Wt Readings from Last 1 Encounters:   19 1417 74.8 kg (164 lb 14.5 oz)       BP Readings from Last 3 Encounters:   10/31/19 (!) 94/52   19 100/60   19 110/70       Patient Active Problem List   Diagnosis    Hepatitis C    Tobacco abuse    Pregnancy with poor reproductive history, antepartum    IV drug abuse complicating pregnancy    Methadone use    Grand multiparity    History of postpartum hemorrhage requiring transfusion    Graves disease    PTSD (post-traumatic stress disorder)    Mental disorder affecting pregnancy, antepartum    Viral hepatitis affecting pregnancy, antepartum    Hyperthyroidism affecting pregnancy, antepartum    Encounter for sterilization    Tobacco smoking affecting pregnancy, antepartum    Indication for care in labor and delivery, antepartum     (spontaneous vaginal delivery)    40 weeks gestation of pregnancy       No past surgical history on file.    Social History     Socioeconomic History    Marital status: Single     Spouse name: Not on file    Number of children: Not on file    Years of education: Not on file    Highest education level: Not on file   Occupational History     Not on file   Social Needs    Financial resource strain: Not on file    Food insecurity:     Worry: Not on file     Inability: Not on file    Transportation needs:     Medical: Not on file     Non-medical: Not on file   Tobacco Use    Smoking status: Current Every Day Smoker     Packs/day: 0.50     Types: Cigarettes    Smokeless tobacco: Never Used   Substance and Sexual Activity    Alcohol use: No    Drug use: No     Comment: heroin past    Sexual activity: Yes     Partners: Male     Birth control/protection: None   Lifestyle    Physical activity:     Days per week: Not on file     Minutes per session: Not on file    Stress: Not on file   Relationships    Social connections:     Talks on phone: Not on file     Gets together: Not on file     Attends Yazidism service: Not on file     Active member of club or organization: Not on file     Attends meetings of clubs or organizations: Not on file     Relationship status: Not on file   Other Topics Concern    Not on file   Social History Narrative    Not on file         Chemistry        Component Value Date/Time     (L) 08/07/2019 1450    K 3.7 08/07/2019 1450     08/07/2019 1450    CO2 23 08/07/2019 1450    BUN 6 08/07/2019 1450    CREATININE 0.7 08/07/2019 1450    GLU 68 (L) 08/07/2019 1450        Component Value Date/Time    CALCIUM 9.1 08/07/2019 1450    ALKPHOS 100 08/07/2019 1450    AST 60 (H) 08/07/2019 1450    ALT 49 (H) 08/07/2019 1450    BILITOT 0.5 08/07/2019 1450    ESTGFRAFRICA >60.0 08/07/2019 1450    EGFRNONAA >60.0 08/07/2019 1450            Lab Results   Component Value Date    WBC 9.24 10/30/2019    HGB 8.7 (L) 10/30/2019    HCT 28.4 (L) 10/30/2019    MCV 82 10/30/2019     10/30/2019       No results for input(s): PT, INR, PROTIME, APTT in the last 72 hours.                                                                                                                       10/31/2019  Jocelynn Fowler is a 34 y.o.,  female.    Anesthesia Evaluation    I have reviewed the Patient Summary Reports.    I have reviewed the Nursing Notes.   I have reviewed the Medications.     Review of Systems  Anesthesia Hx:  No problems with previous Anesthesia Denies Hx of Anesthetic complications  History of prior surgery of interest to airway management or planning:  Denies Personal Hx of Anesthesia complications.   Social:  Smoker, No Alcohol Use IV drug use, on methadone 90mg daily   Cardiovascular:  Cardiovascular Normal  Denies Hypertension.     Pulmonary:  Pulmonary Normal  Denies Asthma.    Renal/:  Renal/ Normal     Hepatic/GI:   Hepatitis, C    Neurological:  Neurology Normal    Endocrine:   Hypothyroidism    Psych:   anxiety depression          Physical Exam  General:  Well nourished    Airway/Jaw/Neck:  Airway Findings: Mouth Opening: Normal Tongue: Normal  General Airway Assessment: Adult  Mallampati: III  Improves to II with phonation.  TM Distance: Normal, at least 6 cm      Dental:  Dental Findings: In tact   Chest/Lungs:  Chest/Lungs Findings: Clear to auscultation, Normal Respiratory Rate     Heart/Vascular:  Heart Findings: Rate: Normal  Rhythm: Regular Rhythm     Abdomen:  Abdomen Findings: Normal    Musculoskeletal:  Musculoskeletal Findings: Normal    Mental Status:  Mental Status Findings:  Cooperative, Alert and Oriented         Anesthesia Plan  Type of Anesthesia, risks & benefits discussed:  Anesthesia Type:  general, spinal  Patient's Preference:   Intra-op Monitoring Plan: standard ASA monitors  Intra-op Monitoring Plan Comments:   Post Op Pain Control Plan: multimodal analgesia, IV/PO Opioids PRN and per primary service following discharge from PACU  Post Op Pain Control Plan Comments:   Induction:   IV  Beta Blocker:  Patient is not currently on a Beta-Blocker (No further documentation required).       Informed Consent: Patient understands risks and agrees with Anesthesia plan.  Questions answered. Anesthesia  consent signed with patient.  ASA Score: 3     Day of Surgery Review of History & Physical:    H&P update referred to the surgeon.         Ready For Surgery From Anesthesia Perspective.

## 2019-11-01 LAB
BLD PROD TYP BPU: NORMAL
BLD PROD TYP BPU: NORMAL
BLOOD UNIT EXPIRATION DATE: NORMAL
BLOOD UNIT EXPIRATION DATE: NORMAL
BLOOD UNIT TYPE CODE: 6200
BLOOD UNIT TYPE CODE: 6200
BLOOD UNIT TYPE: NORMAL
BLOOD UNIT TYPE: NORMAL
CODING SYSTEM: NORMAL
CODING SYSTEM: NORMAL
DISPENSE STATUS: NORMAL
DISPENSE STATUS: NORMAL
HCV RNA SERPL NAA+PROBE-LOG IU: 6.94 LOG (10) IU/ML
HCV RNA SERPL QL NAA+PROBE: DETECTED IU/ML
HCV RNA SPEC NAA+PROBE-ACNC: ABNORMAL IU/ML
TRANS ERYTHROCYTES VOL PATIENT: NORMAL ML
TRANS ERYTHROCYTES VOL PATIENT: NORMAL ML

## 2019-11-01 NOTE — NURSING
Discharge orders placed. Discharge instructions and AVS discussed with patient. No questions at this time. Patient discharged home.

## 2019-12-11 ENCOUNTER — TELEPHONE (OUTPATIENT)
Dept: OBSTETRICS AND GYNECOLOGY | Facility: OTHER | Age: 34
End: 2019-12-11

## 2020-07-23 ENCOUNTER — HOSPITAL ENCOUNTER (EMERGENCY)
Facility: HOSPITAL | Age: 35
Discharge: HOME | End: 2020-07-23
Attending: INTERNAL MEDICINE
Payer: MEDICAID

## 2020-07-23 VITALS
HEIGHT: 64 IN | HEART RATE: 89 BPM | OXYGEN SATURATION: 97 % | WEIGHT: 163 LBS | TEMPERATURE: 99 F | RESPIRATION RATE: 18 BRPM | DIASTOLIC BLOOD PRESSURE: 72 MMHG | BODY MASS INDEX: 27.83 KG/M2 | SYSTOLIC BLOOD PRESSURE: 115 MMHG

## 2020-07-23 DIAGNOSIS — L02.91 ABSCESS: Primary | ICD-10-CM

## 2020-07-23 PROBLEM — F11.20 HEROIN ADDICTION: Status: ACTIVE | Noted: 2020-07-23

## 2020-07-23 LAB
B-HCG UR QL: NEGATIVE
CTP QC/QA: YES

## 2020-07-23 PROCEDURE — 25000003 PHARM REV CODE 250: Mod: ER | Performed by: INTERNAL MEDICINE

## 2020-07-23 PROCEDURE — 87077 CULTURE AEROBIC IDENTIFY: CPT

## 2020-07-23 PROCEDURE — 87186 SC STD MICRODIL/AGAR DIL: CPT

## 2020-07-23 PROCEDURE — 90715 TDAP VACCINE 7 YRS/> IM: CPT | Mod: SL,ER | Performed by: INTERNAL MEDICINE

## 2020-07-23 PROCEDURE — 63600175 PHARM REV CODE 636 W HCPCS: Mod: SL,ER | Performed by: INTERNAL MEDICINE

## 2020-07-23 PROCEDURE — 90471 IMMUNIZATION ADMIN: CPT | Mod: VFC,ER | Performed by: INTERNAL MEDICINE

## 2020-07-23 PROCEDURE — 87070 CULTURE OTHR SPECIMN AEROBIC: CPT

## 2020-07-23 PROCEDURE — 81025 URINE PREGNANCY TEST: CPT | Mod: ER | Performed by: INTERNAL MEDICINE

## 2020-07-23 PROCEDURE — 99284 EMERGENCY DEPT VISIT MOD MDM: CPT | Mod: 25,ER

## 2020-07-23 PROCEDURE — 10060 I&D ABSCESS SIMPLE/SINGLE: CPT | Mod: ER

## 2020-07-23 RX ORDER — CLINDAMYCIN HYDROCHLORIDE 150 MG/1
600 CAPSULE ORAL
Status: COMPLETED | OUTPATIENT
Start: 2020-07-23 | End: 2020-07-23

## 2020-07-23 RX ORDER — LIDOCAINE HYDROCHLORIDE 10 MG/ML
10 INJECTION INFILTRATION; PERINEURAL
Status: COMPLETED | OUTPATIENT
Start: 2020-07-23 | End: 2020-07-23

## 2020-07-23 RX ORDER — CLINDAMYCIN HYDROCHLORIDE 150 MG/1
300 CAPSULE ORAL EVERY 8 HOURS
Qty: 60 CAPSULE | Refills: 0 | Status: SHIPPED | OUTPATIENT
Start: 2020-07-23 | End: 2020-08-02

## 2020-07-23 RX ORDER — CLINDAMYCIN HYDROCHLORIDE 150 MG/1
300 CAPSULE ORAL EVERY 8 HOURS
Qty: 60 CAPSULE | Refills: 0 | Status: SHIPPED | OUTPATIENT
Start: 2020-07-23 | End: 2020-07-23 | Stop reason: SDUPTHER

## 2020-07-23 RX ADMIN — CLINDAMYCIN HYDROCHLORIDE 600 MG: 150 CAPSULE ORAL at 10:07

## 2020-07-23 RX ADMIN — CLOSTRIDIUM TETANI TOXOID ANTIGEN (FORMALDEHYDE INACTIVATED), CORYNEBACTERIUM DIPHTHERIAE TOXOID ANTIGEN (FORMALDEHYDE INACTIVATED), BORDETELLA PERTUSSIS TOXOID ANTIGEN (GLUTARALDEHYDE INACTIVATED), BORDETELLA PERTUSSIS FILAMENTOUS HEMAGGLUTININ ANTIGEN (FORMALDEHYDE INACTIVATED), BORDETELLA PERTUSSIS PERTACTIN ANTIGEN, AND BORDETELLA PERTUSSIS FIMBRIAE 2/3 ANTIGEN 0.5 ML: 5; 2; 2.5; 5; 3; 5 INJECTION, SUSPENSION INTRAMUSCULAR at 10:07

## 2020-07-23 RX ADMIN — LIDOCAINE HYDROCHLORIDE 10 ML: 10 INJECTION, SOLUTION INFILTRATION; PERINEURAL at 10:07

## 2020-07-24 NOTE — ED PROVIDER NOTES
Encounter Date: 7/23/2020    SCRIBE #1 NOTE: I, Kailyn Owens, am scribing for, and in the presence of,  Dr. Avitia. I have scribed the following portions of the note - Other sections scribed: HPI, ROS, PE.       History     Chief Complaint   Patient presents with    Skin Problem     PT REPORTS USING IV HEROIN ON JULY 4TH AND AREA TO RIGHT FOREARM RED AND SWOLLEN FOR 2 DAYS, PT REPORTS PLACING POTATO ON AREA LAST NIGHT AND WRAPPING ARM TIGHTLY FOR 8 HOURS, UNABLE TO PALPATE RADIAL PULSE TO LEFT ARM AT TRIAGE, GOOD CAP REFILL NOTED    Arm Problem     Jocelynn Fowler is a 34 y.o. female who presents to the ED complaining of redness and swelling to her right forearm for the last 2 days. Pt report treating by placing a potato on to the area and wrapping her arm tightly for 8 hours with no relief. She also reports using IV heroin on the 4th of July in the same area. Pt denies fever, sore throat, shortness of breath, dysuria, behavioral problems, weakness, chest pain, headaches, diarrhea, back pain, nausea and vomiting.       The history is provided by the patient. No  was used.     Review of patient's allergies indicates:   Allergen Reactions    Toradol [ketorolac] Hives     Can take ibuprofen per patient report     Past Medical History:   Diagnosis Date    Anemia     Anxiety     Depression     Encounter for blood transfusion     Graves disease     Hashimoto's disease     Hep C w/o coma, chronic     Hypothyroid     IV drug user     Ovarian cyst     Renal disorder     Substance abuse      Past Surgical History:   Procedure Laterality Date    POSTPARTUM LIGATION OF FALLOPIAN TUBE Bilateral 10/31/2019    Procedure: LIGATION, FALLOPIAN TUBE, POSTPARTUM;  Surgeon: Bettina Auguste MD;  Location: [facility] L&D;  Service: OB/GYN;  Laterality: Bilateral;    TUBAL LIGATION       Family History   Problem Relation Age of Onset    Heart disease Maternal Grandmother     Breast cancer Maternal  Grandmother     Heart disease Maternal Grandfather     Heart disease Paternal Grandmother     Heart disease Paternal Grandfather     Ovarian cancer Neg Hx     Colon cancer Neg Hx      Social History     Tobacco Use    Smoking status: Current Every Day Smoker     Packs/day: 0.50     Types: Cigarettes    Smokeless tobacco: Never Used   Substance Use Topics    Alcohol use: No    Drug use: Yes     Types: IV     Comment: heroin      Review of Systems   Constitutional: Negative for fever.   HENT: Negative for sore throat.    Respiratory: Negative for shortness of breath.    Cardiovascular: Negative for chest pain.   Gastrointestinal: Negative for nausea and vomiting.   Genitourinary: Negative for dysuria.   Musculoskeletal: Negative for back pain.   Skin: Positive for color change.   Neurological: Negative for weakness.   Hematological: Negative for adenopathy.   Psychiatric/Behavioral: Negative for behavioral problems.   All other systems reviewed and are negative.      Physical Exam     Initial Vitals [07/23/20 2138]   BP Pulse Resp Temp SpO2   122/82 99 (!) 22 98.8 °F (37.1 °C) 99 %      MAP       --         Physical Exam    Nursing note and vitals reviewed.  Constitutional: She appears well-developed and well-nourished.   HENT:   Head: Normocephalic and atraumatic.   Eyes: Conjunctivae are normal.   Neck: Normal range of motion. Neck supple.   Cardiovascular: Normal rate, regular rhythm and normal heart sounds. Exam reveals no gallop and no friction rub.    No murmur heard.  Pulmonary/Chest: Breath sounds normal. No respiratory distress. She has no wheezes. She has no rhonchi. She has no rales.   Abdominal: Soft. There is no abdominal tenderness.   Musculoskeletal: Normal range of motion. No edema.   Neurological: She is alert and oriented to person, place, and time. GCS score is 15. GCS eye subscore is 4. GCS verbal subscore is 5. GCS motor subscore is 6.   Skin: Skin is warm and dry.   Psychiatric: She has  a normal mood and affect.         ED Course   I & D - Incision and Drainage    Date/Time: 7/24/2020 3:49 AM  Location procedure was performed: [facility] EMERGENCY DEPARTMENT  Performed by: Kvng Avitia MD  Authorized by: Kvng Avitia MD   Pre-operative diagnosis: Abscess of left forearm  Post-operative diagnosis: Abscess of left forearm, status post incision and drainage  Consent Done: Yes  Consent: Verbal consent not obtained.  Risks and benefits: risks, benefits and alternatives were discussed  Consent given by: patient  Patient understanding: patient states understanding of the procedure being performed  Patient consent: the patient's understanding of the procedure matches consent given  Procedure consent: procedure consent matches procedure scheduled  Body area: upper extremity  Location details: left arm  Anesthesia: local infiltration    Anesthesia:  Local Anesthetic: lidocaine 1% without epinephrine  Anesthetic total: 5 mL  Patient sedated: no  Scalpel size: 11  Incision type: single straight  Complexity: simple  Drainage: pus  Drainage amount: moderate  Wound treatment: wound packed  Packing material: 1/4 in iodoform gauze  Complications: No  Estimated blood loss (mL): 2  Specimens: Yes  Implants: No  Patient tolerance: Patient tolerated the procedure well with no immediate complications        Labs Reviewed   CULTURE, AEROBIC  (SPECIFY SOURCE)   POCT URINE PREGNANCY          Imaging Results    None          Medical Decision Making:   History:   Old Medical Records: I decided to obtain old medical records.  Initial Assessment:   34 y.o. female who presents to the ED complaining of redness and swelling to her right forearm for the last 2 days  Clinical Tests:   Lab Tests: Ordered and Reviewed  ED Management:  Patient was given instructions for abscess and received clindamycin in the emergency department as well as a prescription clindamycin.  She was advised to follow up with her primary care physician  within the next 2 days for re-evaluation/return to the emergency department if condition worsens.  She was also advised to remove packing in 24 hrs.            Scribe Attestation:   Scribe #1: I performed the above scribed service and the documentation accurately describes the services I performed. I attest to the accuracy of the note.    This document was produced by a scribe under my direction and in my presence. I agree with the content of the note and have made any necessary edits.     Dr. Avitia    07/24/2020 3:50 AM                        Clinical Impression:     1. Abscess    2. Heroin addiction                ED Disposition Condition    Discharge Stable        ED Prescriptions     Medication Sig Dispense Start Date End Date Auth. Provider    clindamycin (CLEOCIN) 150 MG capsule  (Status: Discontinued) Take 2 capsules (300 mg total) by mouth every 8 (eight) hours. for 10 days 60 capsule 7/23/2020 7/23/2020 Kvng Avitia MD    clindamycin (CLEOCIN) 150 MG capsule Take 2 capsules (300 mg total) by mouth every 8 (eight) hours. for 10 days 60 capsule 7/23/2020 8/2/2020 Kvng Avitia MD        Follow-up Information     Follow up With Specialties Details Why Contact Info    Alverto Mann MD General Practice Schedule an appointment as soon as possible for a visit in 1 day For reevaluation [address] [facility]  [place] [id number] [phone]                                       Kvng Avitia MD  07/24/20 0452

## 2020-07-26 LAB — BACTERIA SPEC AEROBE CULT: ABNORMAL

## 2020-07-27 PROBLEM — O09.299 PREGNANCY WITH POOR REPRODUCTIVE HISTORY, ANTEPARTUM: Status: RESOLVED | Noted: 2019-06-03 | Resolved: 2020-07-27

## 2020-10-05 PROBLEM — Z3A.40 40 WEEKS GESTATION OF PREGNANCY: Status: RESOLVED | Noted: 2019-10-29 | Resolved: 2020-10-05

## 2023-12-01 NOTE — OP NOTE
Operative Note  Post-Partum Bilateral Tubal Ligation    Date of Procedure: 10/31/2019    Pre-Operative Diagnosis:   1.Desires permanent sterilization,   Post-Partum Day # 2    Post-Operative Diagnosis: Same    Procedure: Post-Partum BTL via Modified Paauilo method    Surgeon: Bettina Auguste MD    Assistant: Lorraine Sullivan MD    Anesthesia: spinal    EBL:100mL    Specimen: Bilateral Tubal Segments    Complications: none    Findings: normal fallopian tubes    Procedure in Detail:  After verifying consent, the patient was taken to the OR where she was placed in the dorsal supine position after spinal anesthesia was found to be adequate.    The abdomen was then prepped and draped in the normal, sterile, fashion and two allis clamps were placed lateral to the umbilicus and lateral traction was applied.  The skin incision was made using the scalpel inferior to the umbilicus.  The fascia was then identified and grasped with two Allis clamps and entered sharply using the scalpel blade. The peritoneum was then identified and entered bluntly.    Two [employer] retractors were used to expose the right cornua of the uterus, the right tube was then identified and brought out of the incision using a estefani clamp.  The tube was then followed out to the fimbriated end.  The isthmic portion of the tube was grasped in an avascular portion and elevated with the estefani clamp and a 2 cm portion of fallopian tube was ligated with 0 plain gut suture using the Modified Paauilo method.  The tubal portion was then excised and sent to pathology for confirmation.  After hemostasis was verified, the tube was carefully placed back into the abdominal cavity.  The left fallopian tube was ligated in a similar fashion.  The umbilical fascia was closed using  0 Vicryl. The subcutaneous tissue was closed with 2.0 Vicryl. The skin was closed in a subcuticular fashion using 4-0 Monocryl.    The patient tolerated the procedure well, S/L/N  Patient is accepted at [facility]  Patient is accepted by Dr. Muriel Penny per Brianne Jones, Intake     Transport can be arranged for 1915. Roundtrip completed. Nurse report is to be called to [phone] prior to patient transfer. counts were correct x 2, patient was taken to recovery in stable condition.      Lorraine Sullivan M.D.  RYAN PGY1

## 2025-06-30 ENCOUNTER — HOSPITAL ENCOUNTER (EMERGENCY)
Facility: HOSPITAL | Age: 40
Discharge: HOME | End: 2025-06-30
Attending: STUDENT IN AN ORGANIZED HEALTH CARE EDUCATION/TRAINING PROGRAM
Payer: MEDICAID

## 2025-06-30 VITALS
HEIGHT: 64 IN | WEIGHT: 165 LBS | BODY MASS INDEX: 28.17 KG/M2 | DIASTOLIC BLOOD PRESSURE: 66 MMHG | TEMPERATURE: 98 F | SYSTOLIC BLOOD PRESSURE: 104 MMHG | RESPIRATION RATE: 18 BRPM | HEART RATE: 53 BPM | OXYGEN SATURATION: 96 %

## 2025-06-30 DIAGNOSIS — K80.20 GALLSTONES: ICD-10-CM

## 2025-06-30 DIAGNOSIS — N30.00 ACUTE CYSTITIS WITHOUT HEMATURIA: Primary | ICD-10-CM

## 2025-06-30 DIAGNOSIS — R10.2 PELVIC PAIN: ICD-10-CM

## 2025-06-30 LAB
ABSOLUTE EOSINOPHIL (OHS): 0.03 K/UL
ABSOLUTE MONOCYTE (OHS): 0.34 K/UL (ref 0.3–1)
ABSOLUTE NEUTROPHIL COUNT (OHS): 4.81 K/UL (ref 1.8–7.7)
ALBUMIN SERPL BCP-MCNC: 3.7 G/DL (ref 3.5–5.2)
ALP SERPL-CCNC: 61 UNIT/L (ref 40–150)
ALT SERPL W/O P-5'-P-CCNC: 41 UNIT/L (ref 10–44)
ANION GAP (OHS): 10 MMOL/L (ref 8–16)
AST SERPL-CCNC: 42 UNIT/L (ref 11–45)
B-HCG UR QL: NEGATIVE
BACTERIA #/AREA URNS AUTO: ABNORMAL /HPF
BACTERIA GENITAL QL WET PREP: ABNORMAL
BASOPHILS # BLD AUTO: 0.02 K/UL
BASOPHILS NFR BLD AUTO: 0.3 %
BILIRUB SERPL-MCNC: 0.4 MG/DL (ref 0.1–1)
BILIRUB UR QL STRIP.AUTO: ABNORMAL
BUN SERPL-MCNC: 11 MG/DL (ref 6–20)
CALCIUM SERPL-MCNC: 9.3 MG/DL (ref 8.7–10.5)
CHLORIDE SERPL-SCNC: 107 MMOL/L (ref 95–110)
CLARITY UR: ABNORMAL
CLUE CELLS VAG QL WET PREP: ABNORMAL
CO2 SERPL-SCNC: 25 MMOL/L (ref 23–29)
COLOR UR AUTO: YELLOW
CREAT SERPL-MCNC: 0.8 MG/DL (ref 0.5–1.4)
CTP QC/QA: YES
ERYTHROCYTE [DISTWIDTH] IN BLOOD BY AUTOMATED COUNT: 13.2 % (ref 11.5–14.5)
FILAMENT FUNGI VAG WET PREP-#/AREA: ABNORMAL
GFR SERPLBLD CREATININE-BSD FMLA CKD-EPI: >60 ML/MIN/1.73/M2
GLUCOSE SERPL-MCNC: 92 MG/DL (ref 70–110)
GLUCOSE UR QL STRIP: NEGATIVE
HCT VFR BLD AUTO: 38.6 % (ref 37–48.5)
HGB BLD-MCNC: 12.7 GM/DL (ref 12–16)
HGB UR QL STRIP: ABNORMAL
HYALINE CASTS UR QL AUTO: 0 /LPF (ref 0–1)
IMM GRANULOCYTES # BLD AUTO: 0.01 K/UL (ref 0–0.04)
IMM GRANULOCYTES NFR BLD AUTO: 0.2 % (ref 0–0.5)
KETONES UR QL STRIP: ABNORMAL
LEUKOCYTE ESTERASE UR QL STRIP: ABNORMAL
LYMPHOCYTES # BLD AUTO: 1.39 K/UL (ref 1–4.8)
MCH RBC QN AUTO: 30 PG (ref 27–31)
MCHC RBC AUTO-ENTMCNC: 32.9 G/DL (ref 32–36)
MCV RBC AUTO: 91 FL (ref 82–98)
MICROSCOPIC COMMENT: ABNORMAL
NITRITE UR QL STRIP: NEGATIVE
NUCLEATED RBC (/100WBC) (OHS): 0 /100 WBC
PH UR STRIP: 7 [PH]
PLATELET # BLD AUTO: 193 K/UL (ref 150–450)
PMV BLD AUTO: 9.6 FL (ref 9.2–12.9)
POTASSIUM SERPL-SCNC: 4.6 MMOL/L (ref 3.5–5.1)
PROT SERPL-MCNC: 7.8 GM/DL (ref 6–8.4)
PROT UR QL STRIP: ABNORMAL
RBC # BLD AUTO: 4.23 M/UL (ref 4–5.4)
RBC #/AREA URNS AUTO: >100 /HPF (ref 0–4)
RELATIVE EOSINOPHIL (OHS): 0.5 %
RELATIVE LYMPHOCYTE (OHS): 21.1 % (ref 18–48)
RELATIVE MONOCYTE (OHS): 5.2 % (ref 4–15)
RELATIVE NEUTROPHIL (OHS): 72.7 % (ref 38–73)
SODIUM SERPL-SCNC: 142 MMOL/L (ref 136–145)
SP GR UR STRIP: >=1.03
SQUAMOUS #/AREA URNS AUTO: 12 /HPF
T VAGINALIS GENITAL QL WET PREP: ABNORMAL
UROBILINOGEN UR STRIP-ACNC: ABNORMAL EU/DL
WBC # BLD AUTO: 6.6 K/UL (ref 3.9–12.7)
WBC #/AREA URNS AUTO: >100 /HPF (ref 0–5)
WBC #/AREA VAG WET PREP: ABNORMAL
WBC CLUMPS UR QL AUTO: ABNORMAL
YEAST GENITAL QL WET PREP: ABNORMAL

## 2025-06-30 PROCEDURE — 96374 THER/PROPH/DIAG INJ IV PUSH: CPT

## 2025-06-30 PROCEDURE — 87086 URINE CULTURE/COLONY COUNT: CPT

## 2025-06-30 PROCEDURE — 85025 COMPLETE CBC W/AUTO DIFF WBC: CPT | Performed by: PHYSICIAN ASSISTANT

## 2025-06-30 PROCEDURE — 96361 HYDRATE IV INFUSION ADD-ON: CPT

## 2025-06-30 PROCEDURE — 82040 ASSAY OF SERUM ALBUMIN: CPT | Performed by: PHYSICIAN ASSISTANT

## 2025-06-30 PROCEDURE — 25000003 PHARM REV CODE 250: Performed by: PHYSICIAN ASSISTANT

## 2025-06-30 PROCEDURE — 99285 EMERGENCY DEPT VISIT HI MDM: CPT | Mod: 25

## 2025-06-30 PROCEDURE — 81025 URINE PREGNANCY TEST: CPT

## 2025-06-30 PROCEDURE — 81003 URINALYSIS AUTO W/O SCOPE: CPT

## 2025-06-30 PROCEDURE — 63600175 PHARM REV CODE 636 W HCPCS: Performed by: PHYSICIAN ASSISTANT

## 2025-06-30 PROCEDURE — 87491 CHLMYD TRACH DNA AMP PROBE: CPT | Performed by: PHYSICIAN ASSISTANT

## 2025-06-30 PROCEDURE — 87210 SMEAR WET MOUNT SALINE/INK: CPT | Performed by: PHYSICIAN ASSISTANT

## 2025-06-30 RX ORDER — IBUPROFEN 600 MG/1
600 TABLET, FILM COATED ORAL EVERY 6 HOURS PRN
Qty: 20 TABLET | Refills: 0 | Status: SHIPPED | OUTPATIENT
Start: 2025-06-30 | End: 2025-07-05

## 2025-06-30 RX ORDER — CEPHALEXIN 500 MG/1
1000 CAPSULE ORAL EVERY 12 HOURS
Qty: 28 CAPSULE | Refills: 0 | Status: SHIPPED | OUTPATIENT
Start: 2025-06-30 | End: 2025-07-07

## 2025-06-30 RX ORDER — CEFTRIAXONE 1 G/1
1 INJECTION, POWDER, FOR SOLUTION INTRAMUSCULAR; INTRAVENOUS
Status: COMPLETED | OUTPATIENT
Start: 2025-06-30 | End: 2025-06-30

## 2025-06-30 RX ORDER — PHENAZOPYRIDINE HYDROCHLORIDE 200 MG/1
200 TABLET, FILM COATED ORAL
Qty: 6 TABLET | Refills: 0 | Status: SHIPPED | OUTPATIENT
Start: 2025-06-30 | End: 2025-07-02

## 2025-06-30 RX ORDER — IBUPROFEN 600 MG/1
600 TABLET, FILM COATED ORAL
Status: COMPLETED | OUTPATIENT
Start: 2025-06-30 | End: 2025-06-30

## 2025-06-30 RX ADMIN — SODIUM CHLORIDE 1000 ML: 9 INJECTION, SOLUTION INTRAVENOUS at 02:06

## 2025-06-30 RX ADMIN — IBUPROFEN 600 MG: 600 TABLET, FILM COATED ORAL at 01:06

## 2025-06-30 RX ADMIN — CEFTRIAXONE 1 G: 1 INJECTION, POWDER, FOR SOLUTION INTRAMUSCULAR; INTRAVENOUS at 02:06

## 2025-06-30 NOTE — DISCHARGE INSTRUCTIONS

## 2025-06-30 NOTE — ED PROVIDER NOTES
"Encounter Date: 6/30/2025       History     Chief Complaint   Patient presents with    Hematuria    Abdominal Pain     Pt complaining of lower abdominal pain, urinary frequency, dysuria, and hematuria x 3 days unrelieved by ibuprofen. Pt hx of kidney stones      Chief complaint:  Hematuria, abdominal pain    HPI:   39-year-old female with history of IV drug use on methadone, Graves disease, hepatitis-C, endometriosis presenting for evaluation of 3 day history of stabbing vaginal/pelvic pain  pain and lower abdominal pressure with associated urinary frequency and urgency.  She reports she developed hematuria today causing her to be concern for possible kidney stone.  She states that she has had kidney stones in the past " 6 on the right side 8 on the left side "and was told by urology that she would need a procedure however it was not approved by insurance    Reports history of ovarian cysts in the past. Denies vaginal discharge or concern for STI.    She denies fever, chills, nausea vomiting, chest pain, shortness breath, dizziness lightheadedness    The history is provided by the patient.     Review of patient's allergies indicates:   Allergen Reactions    Toradol [ketorolac] Hives     Can take ibuprofen per patient report     Past Medical History:   Diagnosis Date    Anemia     Anxiety     Depression     Encounter for blood transfusion     Graves disease     Hashimoto's disease     Hep C w/o coma, chronic     Hypothyroid     IV drug user     Ovarian cyst     Renal disorder     Substance abuse      Past Surgical History:   Procedure Laterality Date    POSTPARTUM LIGATION OF FALLOPIAN TUBE Bilateral 10/31/2019    Procedure: LIGATION, FALLOPIAN TUBE, POSTPARTUM;  Surgeon: Bettina Auguste MD;  Location: [facility] L&D;  Service: OB/GYN;  Laterality: Bilateral;    TUBAL LIGATION       Family History   Problem Relation Name Age of Onset    Heart disease Maternal Grandmother      Breast cancer Maternal Grandmother      " Heart disease Maternal Grandfather      Heart disease Paternal Grandmother      Heart disease Paternal Grandfather      Ovarian cancer Neg Hx      Colon cancer Neg Hx       Social History[1]  Review of Systems   Constitutional:  Negative for chills and fever.   HENT:  Negative for congestion, ear pain, nosebleeds, rhinorrhea, sore throat and trouble swallowing.    Eyes:  Negative for redness.   Respiratory:  Negative for cough, shortness of breath and stridor.    Cardiovascular:  Negative for chest pain.   Gastrointestinal:  Negative for abdominal pain, constipation, diarrhea, nausea and vomiting.   Genitourinary:  Positive for frequency, pelvic pain, urgency and vaginal pain. Negative for decreased urine volume, dysuria, hematuria, vaginal bleeding and vaginal discharge.   Musculoskeletal:  Negative for back pain and neck pain.   Skin:  Negative for rash and wound.   Neurological:  Negative for dizziness, speech difficulty, weakness, light-headedness, numbness and headaches.   Hematological:  Does not bruise/bleed easily.   Psychiatric/Behavioral:  Negative for confusion.        Physical Exam     Initial Vitals [06/30/25 1314]   BP Pulse Resp Temp SpO2   113/66 95 19 98.2 °F (36.8 °C) 96 %      MAP       --         Physical Exam    Nursing note and vitals reviewed.  Constitutional: She appears well-developed and well-nourished. No distress.   HENT:   Head: Normocephalic.   Right Ear: External ear normal.   Left Ear: External ear normal.   Eyes: Conjunctivae are normal. Right eye exhibits no discharge. Left eye exhibits no discharge. No scleral icterus.   Neck: No tracheal deviation present.   Cardiovascular:  Normal rate and regular rhythm.     Exam reveals no gallop and no friction rub.       No murmur heard.  Pulmonary/Chest: Breath sounds normal. No stridor. No respiratory distress. She has no wheezes. She has no rhonchi. She has no rales.   Abdominal: Abdomen is soft. She exhibits no distension. There is  abdominal tenderness. There is no rebound.   Genitourinary:    Genitourinary Comments: Pelvic exam chaperoned by Marbella QUIROS   Scant amount of thin clear discharge  No CMT. Uterine and adnexal ttp with no palpable masses       Musculoskeletal:         General: Normal range of motion.     Neurological: She is alert.   Skin: Skin is warm and dry. No rash noted. No erythema.   Psychiatric: She has a normal mood and affect. Her behavior is normal. Judgment and thought content normal.         ED Course   Procedures  Labs Reviewed   WET PREP, GENITAL - Abnormal       Result Value    WBC Few (*)     Bacteria Few (*)     Clue Cells None      TRICHOMONAS  None      BUDDING YEAST None      FUNGAL HYPHAE None     URINALYSIS, REFLEX TO URINE CULTURE - Abnormal    Color, UA Yellow      Appearance, UA Cloudy (*)     pH, UA 7.0      Spec Grav UA >=1.030 (*)     Protein, UA 3+ (*)     Glucose, UA Negative      Ketones, UA Trace (*)     Bilirubin, UA 1+ (*)     Blood, UA 2+ (*)     Nitrites, UA Negative      Urobilinogen, UA 2.0-3.0 (*)     Leukocyte Esterase, UA 1+ (*)    URINALYSIS MICROSCOPIC - Abnormal    RBC, UA >100 (*)     WBC, UA >100 (*)     WBC Clumps, UA Few (*)     Bacteria, UA Moderate (*)     Squamous Epithelial Cells, UA 12      Hyaline Casts, UA 0      Microscopic Comment       COMPREHENSIVE METABOLIC PANEL - Normal    Sodium 142      Potassium 4.6      Chloride 107      CO2 25      Glucose 92      BUN 11      Creatinine 0.8      Calcium 9.3      Protein Total 7.8      Albumin 3.7      Bilirubin Total 0.4      ALP 61      AST 42      ALT 41      Anion Gap 10      eGFR >60     CBC WITH DIFFERENTIAL - Normal    WBC 6.60      RBC 4.23      HGB 12.7      HCT 38.6      MCV 91      MCH 30.0      MCHC 32.9      RDW 13.2      Platelet Count 193      MPV 9.6      Nucleated RBC 0      Neut % 72.7      Lymph % 21.1      Mono % 5.2      Eos % 0.5      Basophil % 0.3      Imm Grans % 0.2      Neut # 4.81      Lymph # 1.39       Mono # 0.34      Eos # 0.03      Baso # 0.02      Imm Grans # 0.01     CULTURE, URINE   CBC W/ AUTO DIFFERENTIAL    Narrative:     The following orders were created for panel order CBC auto differential.  Procedure                               Abnormality         Status                     ---------                               -----------         ------                     CBC with Differential[[id number]]        Normal              Final result                 Please view results for these tests on the individual orders.   GREY TOP URINE HOLD   C. TRACHOMATIS/N. GONORRHOEAE BY AMP DNA   POCT URINE PREGNANCY    POC Preg Test, Ur Negative       Acceptable Yes            Imaging Results              US Transvaginal Non OB (Final result)  Result time 06/30/25 15:50:14      Final result by Angel Orozco MD (06/30/25 15:50:14)                   Impression:      2.4 cm simple right ovarian cyst.  No follow-up required in a premenopausal patient.      Electronically signed by: Angel Orozco  Date:    06/30/2025  Time:    15:50               Narrative:    EXAMINATION:  US TRANSVAGINAL NON OB    CLINICAL HISTORY:  Pelvic and perineal pain    TECHNIQUE:  Transabdominal and transvaginal images of the pelvis were acquired.    COMPARISON:  Same day CT    FINDINGS:  Uterus: Measures 9.3 x 6.0 x 4.8 cm.  No focal mass.  The endometrium measures 1.1 cm in thickness.    Right ovary: Measures 3.1 x 2.6 x 2.2 cm.  Arterial and venous flow is detected.  There is a 2.4 cm simple cyst in the right ovary.    Left ovary: Measures 3.5 x 2.3 x 1.7 cm.  Arterial and venous flow is detected.  Normal appearance.    Miscellaneous: No free fluid.                                       CT Renal Stone Study ABD Pelvis WO (Final result)  Result time 06/30/25 14:26:14      Final result by Angel Orozco MD (06/30/25 14:26:14)                   Impression:      No renal stone or hydronephrosis.    Cholelithiasis.  No evidence of  cholecystitis or biliary obstruction.    Splenomegaly.    Right sacroiliitis.      Electronically signed by: Angel Orozco  Date:    06/30/2025  Time:    14:26               Narrative:    EXAMINATION:  CT RENAL STONE STUDY ABD PELVIS WO    CLINICAL HISTORY:  Flank pain, kidney stone suspected;    TECHNIQUE:  Low dose axial images, sagittal and coronal reformations were obtained from the lung bases to the pubic symphysis.  Contrast was not administered.    COMPARISON:  CT renal stone 11/17/2016    FINDINGS:  LUNG BASES: Unremarkable.    HEPATOBILIARY: No focal hepatic lesions.  Two peripherally calcified gallstones.  No gallbladder distention, wall thickening, or adjacent inflammatory changes.  No bile duct dilatation.    SPLEEN: Splenomegaly measuring 13.1 cm.    PANCREAS: No focal masses or ductal dilatation.    ADRENALS: No adrenal nodules.    KIDNEYS/URETERS: No hydronephrosis, stones, or solid mass lesions.    BLADDER/PELVIC ORGANS: Unremarkable.    PERITONEUM / RETROPERITONEUM: No free air or fluid.    LYMPH NODES: Prominent periportal lymph node measuring 1.4 cm, unchanged from 2016.  No size significant lymphadenopathy.    VESSELS: Unremarkable.    GI TRACT: No distention or wall thickening. Normal appendix.    BONES AND SOFT TISSUES: Small fat containing umbilical hernia.  Small erosion and subchondral sclerosis at the right sacroiliac joint suggesting sacroiliitis.  No fracture or aggressive osseous lesion.                                       Medications   sodium chloride 0.9% bolus 1,000 mL 1,000 mL (0 mLs Intravenous Stopped 6/30/25 1553)   ibuprofen tablet 600 mg (600 mg Oral Given 6/30/25 1350)   cefTRIAXone injection 1 g (1 g Intravenous Given 6/30/25 1436)     Medical Decision Making  40 y/o F presenting for evaluation of hematuria and abdominal pain    Pt is afebrile nontoxic in no distress.   Exam above.   Suprapubic and pelvic tenderness palpation.  Pelvic exam with vaginal screen and GC  performed  Does have uterine tenderness with no palpable masses  Due to acute onset of pelvic pain will order TV US to r/o ovarian torsion    UPT negative. UA with >100 RBCs and 100 WBCs bacteria. Urine culture pending    WBC normal. H/H normal. CMP with no renal insufficiency or significant electrolyte abnormality.      IV fluids, ibuprofen for pain   She is not septic    CT with no evidence of obstructive uropathy or pyelonephritis  Incidental findings of Cholelithiasis.  No evidence of cholecystitis or biliary obstruction.  Will refer to General surgery for this. Splenomegaly. Right sacroiliitis.     Transvaginal ultrasound with no evidence of ovarian torsion  May be 2/2 endometriosis. PCP follow up in 1-2 days. Return to ER for worsening symptoms or as needed.       Amount and/or Complexity of Data Reviewed  Labs: ordered.  Radiology: ordered.    Risk  Prescription drug management.                                      Clinical Impression:  Final diagnoses:  [R10.2] Pelvic pain  [N30.00] Acute cystitis without hematuria (Primary)  [K80.20] Gallstones          ED Disposition Condition    Discharge Stable          ED Prescriptions       Medication Sig Dispense Start Date End Date Auth. Provider    cephALEXin (KEFLEX) 500 MG capsule Take 2 capsules (1,000 mg total) by mouth every 12 (twelve) hours. for 7 days 28 capsule 6/30/2025 7/7/2025 Monica Cash PA-C    ibuprofen (ADVIL,MOTRIN) 600 MG tablet Take 1 tablet (600 mg total) by mouth every 6 (six) hours as needed. 20 tablet 6/30/2025 7/5/2025 Monica Cash PA-C    phenazopyridine (PYRIDIUM) 200 MG tablet Take 1 tablet (200 mg total) by mouth 3 (three) times daily with meals. for 2 days 6 tablet 6/30/2025 7/2/2025 Monica Cash PA-C          Follow-up Information       Follow up With Specialties Details Why Contact Info    Alverto Mann MD General Practice Schedule an appointment as soon as possible for a visit in 2 days for  follow up [address] [place] [id number]  [phone]      [facility] Emergency Medicine Go to  As needed, If symptoms worsen [address] [facility] [place] [id number] [phone]    [facility] OB/GYN Obstetrics and Gynecology Schedule an appointment as soon as possible for a visit in 2 days for follow up [address] [facility] [place] [phone]    [facility] GENERAL SURGERY General Surgery Schedule an appointment as soon as possible for a visit in 2 days for follow up [address] [facility] [place] [address] [phone]                   [1]   Social History  Tobacco Use    Smoking status: Every Day     Current packs/day: 0.50     Types: Cigarettes    Smokeless tobacco: Never   Substance Use Topics    Alcohol use: No    Drug use: Yes     Types: IV     Comment: heroin         Monica Cash PA-C  06/30/25 1925

## 2025-07-01 ENCOUNTER — RESULTS FOLLOW-UP (OUTPATIENT)
Dept: EMERGENCY MEDICINE | Facility: HOSPITAL | Age: 40
End: 2025-07-01

## 2025-07-01 LAB — HOLD SPECIMEN: NORMAL

## 2025-07-02 LAB — BACTERIA UR CULT: ABNORMAL

## 2025-07-04 LAB
C TRACH DNA SPEC QL NAA+PROBE: NOT DETECTED
CTGC SOURCE (OHS) ORD-325: NORMAL
N GONORRHOEA DNA UR QL NAA+PROBE: NOT DETECTED